# Patient Record
Sex: MALE | Race: WHITE | URBAN - METROPOLITAN AREA
[De-identification: names, ages, dates, MRNs, and addresses within clinical notes are randomized per-mention and may not be internally consistent; named-entity substitution may affect disease eponyms.]

---

## 2017-05-10 ENCOUNTER — MYC MEDICAL ADVICE (OUTPATIENT)
Dept: FAMILY MEDICINE | Facility: CLINIC | Age: 37
End: 2017-05-10

## 2017-05-10 NOTE — TELEPHONE ENCOUNTER
Requesting Finasteride refill  Last seen 6/24/2015  [employer] message sent to pt; needs appt  Antonina HERNANDEZ RN

## 2017-05-17 ENCOUNTER — OFFICE VISIT (OUTPATIENT)
Dept: FAMILY MEDICINE | Facility: CLINIC | Age: 37
End: 2017-05-17
Payer: COMMERCIAL

## 2017-05-17 VITALS
WEIGHT: 182 LBS | OXYGEN SATURATION: 95 % | HEART RATE: 72 BPM | TEMPERATURE: 97.8 F | DIASTOLIC BLOOD PRESSURE: 66 MMHG | BODY MASS INDEX: 24.65 KG/M2 | HEIGHT: 72 IN | SYSTOLIC BLOOD PRESSURE: 111 MMHG

## 2017-05-17 DIAGNOSIS — R73.01 IMPAIRED FASTING GLUCOSE: ICD-10-CM

## 2017-05-17 DIAGNOSIS — L65.9 ALOPECIA: Primary | ICD-10-CM

## 2017-05-17 PROCEDURE — 99213 OFFICE O/P EST LOW 20 MIN: CPT | Performed by: FAMILY MEDICINE

## 2017-05-17 RX ORDER — FINASTERIDE 5 MG/1
TABLET, FILM COATED ORAL
Qty: 30 TABLET | Refills: 3 | Status: SHIPPED | OUTPATIENT
Start: 2017-05-17 | End: 2018-05-20

## 2017-05-17 NOTE — NURSING NOTE
Chief Complaint   Patient presents with     [facility]     meds     Recheck Medication     finasteride        Initial There were no vitals taken for this visit. Estimated body mass index is 22.44 kg/(m^2) as calculated from the following:    Height as of 6/24/15: 6' (1.829 m).    Weight as of 6/24/15: 165 lb 7 oz (75 kg).  Medication Reconciliation: complete   Comfort Reinoso ma

## 2017-05-17 NOTE — PROGRESS NOTES
SUBJECTIVE:                                                    Yonis Mcnair is a 36 year old male who presents to clinic today for the following health issues:    New patient establish care. He had been seeing a physician at [facility], but that physician is retiring so he is looking to establish new primary care.    Medication Followup of Finasteride    Taking Medication as prescribed: yes    Side Effects:  None    Medication Helping Symptoms:  yes       He has been on finasteride 5 mg quarter tablet daily for a couple of years for male pattern baldness. He feels like it's been helpful. He would like to stay on it. He is not expressing any known side effects.    I note that he had lab work done a couple years ago. Lipid panel was normal, but fasting glucose was elevated slightly at 104.    Problem list and histories reviewed & adjusted, as indicated.  Additional history: as documented    Patient Active Problem List   Diagnosis     Alopecia     Hip pain, chronic, left     S/P LASIK surgery     Impaired fasting glucose     Past Surgical History:   Procedure Laterality Date     ARTHROSCOPY HIP, OSTEOPLASTY FEMUR PROXIMAL, COMBINED  1/14/2013    Procedure: COMBINED ARTHROSCOPY HIP, OSTEOPLASTY FEMUR PROXIMAL;  Left Hip Arthroscopy, Cam Correction, Labral Repair ,;  Surgeon: Stanley Edwards MD;  [facility]     ARTHROSCOPY SHOULDER SUPERIOR LABRUM ANTERIOR TO POSTERIOR REPAIR  12/30/2011    Procedure:ARTHROSCOPY SHOULDER SUPERIOR LABRUM ANTERIOR TO POSTERIOR REPAIR; Right Shoulder Arthroscopy,   Superior Labral Anteroposterior Repair, Distal Clavicle Excision              ; Surgeon:JAI HERRERA; Location:[facility]     C SHOULDER SURG PROC UNLISTED       HERNIA REPAIR, UMBILICAL  1979     LASIK Bilateral March 2011       Social History   Substance Use Topics     Smoking status: Former Smoker     Types: Cigarettes     Smokeless tobacco: Former User     Alcohol use 6.0 oz/week      Comment: 5-10DRINKS PER WEEK   "    Family History   Problem Relation Age of Onset     Lipids Father      Allergies Mother      C.A.D. Maternal Uncle      Musculoskeletal Disorder Paternal Grandfather      HEART DISEASE Paternal Grandfather      Asthma Maternal Grandfather      Thyroid Disease Paternal Grandmother      DIABETES Paternal Grandmother      Depression Paternal Grandmother      Breast Cancer Maternal Grandmother      CANCER Maternal Grandmother      Hypertension No family hx of      CEREBROVASCULAR DISEASE No family hx of      Glaucoma No family hx of      Macular Degeneration No family hx of          Current Outpatient Prescriptions   Medication Sig Dispense Refill     Multiple Vitamins-Minerals (MULTIVITAMIN ADULT PO) Take by mouth daily       Fish Oil-Cholecalciferol (FISH OIL + D3 PO)        finasteride (PROSCAR) 5 MG tablet 1/4 pill daily 30 tablet 3     Allergies   Allergen Reactions     Amoxicillin Rash       Reviewed and updated as needed this visit by clinical staff  Tobacco  Allergies  Med Hx  Surg Hx  Fam Hx  Soc Hx      Reviewed and updated as needed this visit by Provider         ROS:  C: NEGATIVE for fever, chills, change in weight  E/M: NEGATIVE for ear, mouth and throat problems  R: NEGATIVE for significant cough or SOB  CV: NEGATIVE for chest pain, palpitations or peripheral edema    OBJECTIVE:                                                    /66 (BP Location: Right arm, Patient Position: Chair, Cuff Size: Adult Regular)  Pulse 72  Temp 97.8  F (36.6  C) (Oral)  Ht 5' 11.75\" (1.822 m)  Wt 182 lb (82.6 kg)  SpO2 95%  BMI 24.86 kg/m2  Body mass index is 24.86 kg/(m^2).  GENERAL: healthy, alert and no distress  HENT: He has a good head of hair without any patchy hair loss or cosmetically unappealing male pattern hair loss    Diagnostic Test Results:  none      ASSESSMENT/PLAN:                                                        ICD-10-CM    1. Alopecia L65.9 finasteride (PROSCAR) 5 MG tablet   2. " Impaired fasting glucose R73.01      We will continue the same finasteride  I recommended a recheck in 1 year with another physical and some repeat fasting lab work    Davey King MD  [facility]  \

## 2017-05-17 NOTE — MR AVS SNAPSHOT
"              After Visit Summary   5/17/2017    Yonis Mcnair    MRN: [id number]           Patient Information     Date Of Birth          1980        Visit Information        Provider Department      5/17/2017 3:20 PM Davey King MD [facility]        Today's Diagnoses     Alopecia    -  1    Impaired fasting glucose           Follow-ups after your visit        Follow-up notes from your care team     Return in about 1 year (around 5/17/2018).      Who to contact     If you have questions or need follow up information about today's clinic visit or your schedule please contact [facility] directly at [phone].  Normal or non-critical lab and imaging results will be communicated to you by MyChart, letter or phone within 4 business days after the clinic has received the results. If you do not hear from us within 7 days, please contact the clinic through [employer] or phone. If you have a critical or abnormal lab result, we will notify you by phone as soon as possible.  Submit refill requests through [employer] or call your pharmacy and they will forward the refill request to us. Please allow 3 business days for your refill to be completed.          Additional Information About Your Visit        MyChart Information     [employer] gives you secure access to your electronic health record. If you see a primary care provider, you can also send messages to your care team and make appointments. If you have questions, please call your primary care clinic.  If you do not have a primary care provider, please call [phone] and they will assist you.        Care EveryWhere ID     This is your Care EveryWhere ID. This could be used by other organizations to access your [place] medical records  [id number]        Your Vitals Were     Pulse Temperature Height Pulse Oximetry BMI (Body Mass Index)       72 97.8  F (36.6  C) (Oral) 5' 11.75\" (1.822 m) 95% 24.86 kg/m2        Blood " Pressure from Last 3 Encounters:   05/17/17 111/66   06/24/15 108/78   03/11/15 120/78    Weight from Last 3 Encounters:   05/17/17 182 lb (82.6 kg)   06/24/15 165 lb 7 oz (75 kg)   03/11/15 168 lb (76.2 kg)              Today, you had the following     No orders found for display         Where to get your medicines      These medications were sent to [facility] [address] [address] AT [address] [employer]  [address] [place] [address]     Phone:  [phone]     finasteride 5 MG tablet          Primary Care Provider    None       No address on file        Thank you!     Thank you for choosing [facility]  for your care. Our goal is always to provide you with excellent care. Hearing back from our patients is one way we can continue to improve our services. Please take a few minutes to complete the written survey that you may receive in the mail after your visit with us. Thank you!             Your Updated Medication List - Protect others around you: Learn how to safely use, store and throw away your medicines at www.disposemymeds.org.          This list is accurate as of: 5/17/17  3:47 PM.  Always use your most recent med list.                   Brand Name Dispense Instructions for use    finasteride 5 MG tablet    PROSCAR    30 tablet    1/4 pill daily       FISH OIL + D3 PO          MULTIVITAMIN ADULT PO      Take by mouth daily

## 2017-07-05 ENCOUNTER — THERAPY VISIT (OUTPATIENT)
Dept: PHYSICAL THERAPY | Facility: CLINIC | Age: 37
End: 2017-07-05
Payer: COMMERCIAL

## 2017-07-05 DIAGNOSIS — M25.571 ACUTE RIGHT ANKLE PAIN: Primary | ICD-10-CM

## 2017-07-05 DIAGNOSIS — S86.011A STRAIN OF ACHILLES TENDON, RIGHT, INITIAL ENCOUNTER: ICD-10-CM

## 2017-07-05 PROCEDURE — 97161 PT EVAL LOW COMPLEX 20 MIN: CPT | Mod: GP | Performed by: PHYSICAL THERAPIST

## 2017-07-05 PROCEDURE — 97110 THERAPEUTIC EXERCISES: CPT | Mod: GP | Performed by: PHYSICAL THERAPIST

## 2017-07-05 PROCEDURE — 97140 MANUAL THERAPY 1/> REGIONS: CPT | Mod: GP | Performed by: PHYSICAL THERAPIST

## 2017-07-05 NOTE — MR AVS SNAPSHOT
After Visit Summary   7/5/2017    Yonis Mcnair    MRN: [id number]           Patient Information     Date Of Birth          1980        Visit Information        Provider Department      7/5/2017 2:20 PM Analia Mckeon PT [facility]        Today's Diagnoses     Acute right ankle pain    -  1    Strain of Achilles tendon, right, initial encounter           Follow-ups after your visit        Your next 10 appointments already scheduled     Jul 19, 2017 12:30 PM CDT   DARLENE Extremity with Analia Mckeon PT   [facility] ([facility])    [address]  [facility] [id number] [phone]              Who to contact     If you have questions or need follow up information about today's clinic visit or your schedule please contact [facility] THERAPY directly at [phone].  Normal or non-critical lab and imaging results will be communicated to you by [employer], letter or phone within 4 business days after the clinic has received the results. If you do not hear from us within 7 days, please contact the clinic through [employer] or phone. If you have a critical or abnormal lab result, we will notify you by phone as soon as possible.  Submit refill requests through [employer] or call your pharmacy and they will forward the refill request to us. Please allow 3 business days for your refill to be completed.          Additional Information About Your Visit        [employer] Information     [employer] gives you secure access to your electronic health record. If you see a primary care provider, you can also send messages to your care team and make appointments. If you have questions, please call your primary care clinic.  If you do not have a primary care provider, please call [phone] and they will assist you.        Care EveryWhere ID     This is your  Care EveryWhere ID. This could be used by other organizations to access your [place] medical records  [id number]         Blood Pressure from Last 3 Encounters:   05/17/17 111/66   06/24/15 108/78   03/11/15 120/78    Weight from Last 3 Encounters:   05/17/17 82.6 kg (182 lb)   06/24/15 75 kg (165 lb 7 oz)   03/11/15 76.2 kg (168 lb)              We Performed the Following     HC PT EVAL, LOW COMPLEXITY     DARLENE INITIAL EVAL REPORT     MANUAL THER TECH,1+REGIONS,EA 15 MIN     THERAPEUTIC EXERCISES        Primary Care Provider    None       No address on file        Equal Access to Services     CARLOS EDUARDO [facility]PAUL : Hadii aad ku hadasho Soomaali, waaxda luqadaha, qaybta kaalmada adeprudenceyada, meghan chaney adeprudence rodriguez . So [facility] [phone].    ATENCIÓN: Si habla español, tiene a pearson disposición servicios gratuitos de asistencia lingüística. Llame al [phone].    We comply with applicable federal civil rights laws and [place] laws. We do not discriminate on the basis of race, color, national origin, age, disability sex, sexual orientation or gender identity.            Thank you!     Thank you for choosing [facility] THER[facility]  for your care. Our goal is always to provide you with excellent care. Hearing back from our patients is one way we can continue to improve our services. Please take a few minutes to complete the written survey that you may receive in the mail after your visit with us. Thank you!             Your Updated Medication List - Protect others around you: Learn how to safely use, store and throw away your medicines at www.disposemymeds.org.          This list is accurate as of: 7/5/17 11:59 PM.  Always use your most recent med list.                   Brand Name Dispense Instructions for use Diagnosis    finasteride 5 MG tablet    PROSCAR    30 tablet    1/4 pill daily    Alopecia       FISH OIL + D3 PO           MULTIVITAMIN ADULT PO      Take by  mouth daily

## 2017-07-11 PROBLEM — M25.571 ACUTE RIGHT ANKLE PAIN: Status: ACTIVE | Noted: 2017-07-11

## 2017-07-11 PROBLEM — S86.011A STRAIN OF ACHILLES TENDON, RIGHT, INITIAL ENCOUNTER: Status: ACTIVE | Noted: 2017-07-11

## 2017-07-11 NOTE — PROGRESS NOTES
"Subjective:    Patient is a 36 year old male presenting with rehab right ankle/foot hpi. The history is provided by the patient. No  was used.   Yonis Mcnair is a 36 year old male with a right ankle condition.  Condition occurred with:  Other reason.  Condition occurred: during recreation/sport.  This is a new condition  Pt was quickly changing directions in tennis on 3-26-17 and about 10-15 min before he had the injury, he felt some pain along achilles insertion. He felt and heard a sounds as if he were to \"snap his fingers slightly\" and he immediately wasn't able to use his foot very well to push off. He went into the [facility] urgent care, the Martínez's test was negative and he was able to WBAT in a cam boot for 4 weeks. He slowly weaned off crutches for 2-3 weeks then went to a shoe with a heel lift for 1 week. In the past 2 months, he has become less and less sore and has been doing 3 weeks of the eccentric protocol (2 sets of 15, 2x/day). He continues to have stiffness and a \"thick feeling\" and feels better after doing some make-shift graston technique but struggles to do it on his own though he does feel it helps. He doesn't feel like he could run right now but doesn't feel comfortable doing anything side to side swiftly. PMH: he believes he has sprained his ankles but that was years ago and he hasn't had any recurring instability. .    Patient reports pain:  Posterior (achilles).  Radiates to:  No radiation.  Pain is described as aching and is intermittent and reported as 2/10 (first thing in the am).  Associated symptoms:  Loss of strength and loss of motion/stiffness. Pain is worse in the A.M..  Symptoms are exacerbated by walking, running, ascending stairs and descending stairs Relieved by: tried US without much difference.  Since onset symptoms are gradually improving.  Special tests:  X-ray.      General health as reported by patient is excellent.  Pertinent medical history includes:  " None.  Medical allergies: yes (amoxicillin).  Other surgeries include:  Orthopedic surgery and other (R shoulder SLAP 2011, Jan of 2013 for hip labrum, Lasik 2011 and herniated umbilicus infant).  Current medications:  None as reported by patient.  Current occupation is PT  .  Patient is working in normal job without restrictions.  Primary job tasks include:  Prolonged standing, prolonged sitting, lifting and repetitive tasks (computer work and pushing and pulling).    Barriers include:  None as reported by patient.    Red flags:  None as reported by patient.                        Objective:    R ankle DF = 6 vs 8 on L    glute med strength was 4-/5 on R and 4+/5 on L and after hip mobilizations, hip strength was 4/5    R hip drop with single leg stance and squat    Mild loss of DF at mid-stance to late stance and push-off on R    Assessment/Plan:      Patient is a 36 year old male with right side ankle complaints.    Patient has the following significant findings with corresponding treatment plan.                Diagnosis 1:  R achilles strain    Pain -  hot/cold therapy, manual therapy, self management, education, directional preference exercise and home program  Decreased ROM/flexibility - manual therapy and therapeutic exercise  Decreased strength - therapeutic exercise and therapeutic activities  Impaired muscle performance - neuro re-education  Decreased function - therapeutic activities    Therapy Evaluation Codes:   1) History comprised of:   Personal factors that impact the plan of care:      None.    Comorbidity factors that impact the plan of care are:      None.     Medications impacting care: None.  2) Examination of Body Systems comprised of:   Body structures and functions that impact the plan of care:      Ankle.   Activity limitations that impact the plan of care are:      Jumping, Running, Sports, Squatting/kneeling and Stairs.  3) Clinical presentation characteristics  are:   Stable/Uncomplicated.  4) Decision-Making    Low complexity using standardized patient assessment instrument and/or measureable assessment of functional outcome.  Cumulative Therapy Evaluation is: Low complexity.    Previous and current functional limitations:  (See Goal Flow Sheet for this information)    Short term and Long term goals: (See Goal Flow Sheet for this information)     Communication ability:  Patient appears to be able to clearly communicate and understand verbal and written communication and follow directions correctly.  Treatment Explanation - The following has been discussed with the patient:   RX ordered/plan of care  Anticipated outcomes  Possible risks and side effects  This patient would benefit from PT intervention to resume normal activities.   Rehab potential is good.    Frequency:  1 X week, once daily  Duration:  for every 2 weeks for 4 visits total (8 weeks)  Discharge Plan:  Achieve all LTG.  Independent in home treatment program.  Reach maximal therapeutic benefit.    Please refer to the daily flowsheet for treatment today, total treatment time and time spent performing 1:1 timed codes.

## 2017-07-19 ENCOUNTER — THERAPY VISIT (OUTPATIENT)
Dept: PHYSICAL THERAPY | Facility: CLINIC | Age: 37
End: 2017-07-19
Payer: COMMERCIAL

## 2017-07-19 DIAGNOSIS — M25.571 ACUTE RIGHT ANKLE PAIN: ICD-10-CM

## 2017-07-19 DIAGNOSIS — S86.011A STRAIN OF ACHILLES TENDON, RIGHT, INITIAL ENCOUNTER: ICD-10-CM

## 2017-07-19 PROCEDURE — 97140 MANUAL THERAPY 1/> REGIONS: CPT | Mod: GP | Performed by: PHYSICAL THERAPIST

## 2017-07-19 NOTE — PROGRESS NOTES
"Subjective:    HPI                    Objective:    System    Physical Exam    General     ROS    Assessment/Plan:      SUBJECTIVE  Subjective changes as noted by pt:  He felt the night splint might have helped the first couple of nights but now he feels pretty much the same. When he does the repeated movement in the am he definitely feels the strain but he feels less stiffness as the day goes on and when he keeps his knee straight he feels some strain. One week ago he felt very sore and stiff for a day after running 1.5 blocks but he didn't feel like he didn't have the power he just felt stiff    Current pain level: 0/10     Changes in function:  Yes (See Goal flowsheet attached for changes in current functional level)     Adverse reaction to treatment or activity:  None     OBJECTIVE  Changes in objective findings:  Yes, less thickness and hypertonicity noted in distal achilles attachment today--most of the soft tissue restriction noted along medial portion 2\"-4\" above distal attachment        ASSESSMENT  Yonis continues to require intervention to meet STG and LTG's: PT  Patient's symptoms are resolving.  Patient is progressing as expected.  Response to therapy has shown an improvement in  pain level and flexibility  Progress made towards STG/LTG?  Yes (See Goal flowsheet attached for updates on achievement of STG and LTG)    PLAN  Current treatment program is being advanced to more complex exercises.    PTA/ATC plan:  N/A    Please refer to the daily flowsheet for treatment today, total treatment time and time spent performing 1:1 timed codes.              "

## 2017-07-19 NOTE — MR AVS SNAPSHOT
After Visit Summary   7/19/2017    Yonis Mcnair    MRN: [id number]           Patient Information     Date Of Birth          1980        Visit Information        Provider Department      7/19/2017 12:30 PM Analia Mckeon PT [facility]        Today's Diagnoses     Strain of Achilles tendon, right, initial encounter        Acute right ankle pain           Follow-ups after your visit        Your next 10 appointments already scheduled     Aug 02, 2017  9:50 AM CDT   DARLENE Extremity with Analia Mckeon PT   [facility] ([facility])    [address]  [facility] [id number] [phone]              Who to contact     If you have questions or need follow up information about today's clinic visit or your schedule please contact [facility] PHYSICAL THERAPY directly at [phone].  Normal or non-critical lab and imaging results will be communicated to you by MyChart, letter or phone within 4 business days after the clinic has received the results. If you do not hear from us within 7 days, please contact the clinic through [facility] or phone. If you have a critical or abnormal lab result, we will notify you by phone as soon as possible.  Submit refill requests through [employer] or call your pharmacy and they will forward the refill request to us. Please allow 3 business days for your refill to be completed.          Additional Information About Your Visit        MyChart Information     [employer] gives you secure access to your electronic health record. If you see a primary care provider, you can also send messages to your care team and make appointments. If you have questions, please call your primary care clinic.  If you do not have a primary care provider, please call [phone] and they will assist you.        Care EveryWhere ID     This is your  Care EveryWhere ID. This could be used by other organizations to access your [place] medical records  [id number]         Blood Pressure from Last 3 Encounters:   05/17/17 111/66   06/24/15 108/78   03/11/15 120/78    Weight from Last 3 Encounters:   05/17/17 82.6 kg (182 lb)   06/24/15 75 kg (165 lb 7 oz)   03/11/15 76.2 kg (168 lb)              We Performed the Following     MANUAL THER TECH,1+REGIONS,EA 15 MIN        Primary Care Provider    None       No address on file        Equal Access to Services     [facility]: Hadii aad ku hadasho Soomaali, waaxda luqadaha, qaybta kaalmada jourdan, meghan rodriguez . So [facility] [phone].    ATENCIÓN: Si habla español, tiene a pearson disposición servicios gratuitos de asistencia lingüística. LlOur [facility] of Mercy [facility] [phone].    We comply with applicable federal civil rights laws and [place] laws. We do not discriminate on the basis of race, color, national origin, age, disability sex, sexual orientation or gender identity.            Thank you!     Thank you for choosing [facility] THERAP[facility]  for your care. Our goal is always to provide you with excellent care. Hearing back from our patients is one way we can continue to improve our services. Please take a few minutes to complete the written survey that you may receive in the mail after your visit with us. Thank you!             Your Updated Medication List - Protect others around you: Learn how to safely use, store and throw away your medicines at www.disposemymeds.org.          This list is accurate as of: 7/19/17 11:59 PM.  Always use your most recent med list.                   Brand Name Dispense Instructions for use Diagnosis    finasteride 5 MG tablet    PROSCAR    30 tablet    1/4 pill daily    Alopecia       FISH OIL + D3 PO           MULTIVITAMIN ADULT PO      Take by mouth daily

## 2018-05-20 DIAGNOSIS — L65.9 ALOPECIA: ICD-10-CM

## 2018-05-21 NOTE — TELEPHONE ENCOUNTER
"Requested Prescriptions   Pending Prescriptions Disp Refills     finasteride (PROSCAR) 5 MG tablet [Pharmacy Med Name: FINASTERIDE 5MG TABLETS] 30 tablet 0    Last Written Prescription Date:  5-17-17  Last Fill Quantity: 30,  # refills: 3   Last office visit: 5/17/2017 with prescribing provider:     Future Office Visit:   Next 5 appointments (look out 90 days)     May 30, 2018 10:00 AM CDT   [facility] with Davey King MD   [facility]    [address] [place] [address]   [phone]                  Sig: TAKE 1/4 TABLET BY MOUTH DAILY    Alpha Blockers Failed    5/20/2018  3:20 PM       Failed - Blood pressure under 140/90 in past 12 months    BP Readings from Last 3 Encounters:   05/17/17 111/66   06/24/15 108/78   03/11/15 120/78                Passed - Recent (12 mo) or future (30 days) visit within the authorizing provider's specialty    Patient had office visit in the last 12 months or has a visit in the next 30 days with authorizing provider or within the authorizing provider's specialty.  See \"Patient Info\" tab in inbasket, or \"Choose Columns\" in Meds & Orders section of the refill encounter.           Passed - Patient does not have Tadalafil, Vardenafil, or Sildenafil on their medication list       Passed - Patient is 18 years of age or older          "

## 2018-05-23 RX ORDER — FINASTERIDE 5 MG/1
TABLET, FILM COATED ORAL
Qty: 30 TABLET | Refills: 0 | Status: SHIPPED | OUTPATIENT
Start: 2018-05-23 | End: 2018-05-30

## 2018-05-23 NOTE — TELEPHONE ENCOUNTER
Prescription approved per [facility] [place].  Pt takes for alopecia, has office visit schedule 5/30/18  Traci Gallegos RN

## 2018-05-30 ENCOUNTER — OFFICE VISIT (OUTPATIENT)
Dept: FAMILY MEDICINE | Facility: CLINIC | Age: 38
End: 2018-05-30
Payer: COMMERCIAL

## 2018-05-30 VITALS
SYSTOLIC BLOOD PRESSURE: 121 MMHG | BODY MASS INDEX: 25.47 KG/M2 | WEIGHT: 188 LBS | DIASTOLIC BLOOD PRESSURE: 77 MMHG | TEMPERATURE: 98.5 F | HEIGHT: 72 IN | HEART RATE: 68 BPM | OXYGEN SATURATION: 96 %

## 2018-05-30 DIAGNOSIS — Z11.4 SCREENING FOR HIV (HUMAN IMMUNODEFICIENCY VIRUS): ICD-10-CM

## 2018-05-30 DIAGNOSIS — R73.01 IMPAIRED FASTING GLUCOSE: ICD-10-CM

## 2018-05-30 DIAGNOSIS — L65.9 ALOPECIA: Primary | ICD-10-CM

## 2018-05-30 PROBLEM — S86.011A STRAIN OF ACHILLES TENDON, RIGHT, INITIAL ENCOUNTER: Status: RESOLVED | Noted: 2017-07-11 | Resolved: 2018-05-30

## 2018-05-30 PROBLEM — M25.571 ACUTE RIGHT ANKLE PAIN: Status: RESOLVED | Noted: 2017-07-11 | Resolved: 2018-05-30

## 2018-05-30 LAB
CHOLEST SERPL-MCNC: 194 MG/DL
GLUCOSE SERPL-MCNC: 118 MG/DL (ref 70–99)
HBA1C MFR BLD: 5.6 % (ref 0–5.6)
HDLC SERPL-MCNC: 55 MG/DL
LDLC SERPL CALC-MCNC: 123 MG/DL
NONHDLC SERPL-MCNC: 139 MG/DL
TRIGL SERPL-MCNC: 81 MG/DL

## 2018-05-30 PROCEDURE — 87389 HIV-1 AG W/HIV-1&-2 AB AG IA: CPT | Performed by: FAMILY MEDICINE

## 2018-05-30 PROCEDURE — 99213 OFFICE O/P EST LOW 20 MIN: CPT | Performed by: FAMILY MEDICINE

## 2018-05-30 PROCEDURE — 80061 LIPID PANEL: CPT | Performed by: FAMILY MEDICINE

## 2018-05-30 PROCEDURE — 82947 ASSAY GLUCOSE BLOOD QUANT: CPT | Performed by: FAMILY MEDICINE

## 2018-05-30 PROCEDURE — 83036 HEMOGLOBIN GLYCOSYLATED A1C: CPT | Performed by: FAMILY MEDICINE

## 2018-05-30 PROCEDURE — 36415 COLL VENOUS BLD VENIPUNCTURE: CPT | Performed by: FAMILY MEDICINE

## 2018-05-30 RX ORDER — FINASTERIDE 5 MG/1
TABLET, FILM COATED ORAL
Qty: 30 TABLET | Refills: 3 | Status: SHIPPED | OUTPATIENT
Start: 2018-05-30 | End: 2019-05-29

## 2018-05-30 NOTE — MR AVS SNAPSHOT
After Visit Summary   5/30/2018    Yonis Mcnair    MRN: [id number]           Patient Information     Date Of Birth          1980        Visit Information        Provider Department      5/30/2018 10:00 AM Davey King MD [facility]        Today's Diagnoses     Alopecia    -  1    Impaired fasting glucose        Screening for HIV (human immunodeficiency virus)           Follow-ups after your visit        Follow-up notes from your care team     Return in about 1 year (around 5/30/2019).      Who to contact     If you have questions or need follow up information about today's clinic visit or your schedule please contact [facility] directly at [phone].  Normal or non-critical lab and imaging results will be communicated to you by MyChart, letter or phone within 4 business days after the clinic has received the results. If you do not hear from us within 7 days, please contact the clinic through [employer] or phone. If you have a critical or abnormal lab result, we will notify you by phone as soon as possible.  Submit refill requests through [employer] or call your pharmacy and they will forward the refill request to us. Please allow 3 business days for your refill to be completed.          Additional Information About Your Visit        MyChart Information     [employer] gives you secure access to your electronic health record. If you see a primary care provider, you can also send messages to your care team and make appointments. If you have questions, please call your primary care clinic.  If you do not have a primary care provider, please call [phone] and they will assist you.        Care EveryWhere ID     This is your Care EveryWhere ID. This could be used by other organizations to access your [facility] medical records  [id number]        Your Vitals Were     Pulse Temperature Height Pulse Oximetry BMI (Body Mass Index)       68 98.5  F (36.9  C)  "(Oral) 5' 11.75\" (1.822 m) 96% 25.68 kg/m2        Blood Pressure from Last 3 Encounters:   05/30/18 121/77   05/17/17 111/66   06/24/15 108/78    Weight from Last 3 Encounters:   05/30/18 188 lb (85.3 kg)   05/17/17 182 lb (82.6 kg)   06/24/15 165 lb 7 oz (75 kg)              We Performed the Following     Glucose     Hemoglobin A1c     HIV Antigen Antibody Combo     Lipid panel reflex to direct LDL Fasting          Where to get your medicines      These medications were sent to [facility] [address]  [address] [facility] [address]     Phone:  [phone]     finasteride 5 MG tablet          Primary Care Provider Office Phone # Fax #    Davey King [phone]       [address] [facility] [address]        Equal Access to Services     CARLOS EDUARDO SANTORO : Hadii aad ku hadasho Soomaali, waaxda luqadaha, qaybta kaalmada adeegyada, waxay idiin hayaan bruceeg kharaceli labrenton . So [facility] [phone].    ATENCIÓN: Si habla español, tiene a pearson disposición servicios gratuitos de asistencia lingüística. Llame al [phone].    We comply with applicable federal civil rights laws and [place] laws. We do not discriminate on the basis of race, color, national origin, age, disability, sex, sexual orientation, or gender identity.            Thank you!     Thank you for choosing [facility]  for your care. Our goal is always to provide you with excellent care. Hearing back from our patients is one way we can continue to improve our services. Please take a few minutes to complete the written survey that you may receive in the mail after your visit with us. Thank you!             Your Updated Medication List - Protect others around you: Learn how to safely use, store and throw away your medicines at www.disposemymeds.org.          This list is accurate as of 5/30/18 10:17 AM.  Always use your most recent " med list.                   Brand Name Dispense Instructions for use Diagnosis    finasteride 5 MG tablet    PROSCAR    30 tablet    TAKE 1/4 TABLET BY MOUTH DAILY    Alopecia       FISH OIL + D3 PO           MULTIVITAMIN ADULT PO      Take by mouth daily

## 2018-05-30 NOTE — PROGRESS NOTES
SUBJECTIVE:   Yonis Mcnair is a 37 year old male who presents to clinic today for the following health issues:      Medication Followup of finasteride    Taking Medication as prescribed: yes    Side Effects:  None    Medication Helping Symptoms:  yes       He is taking low-dose finasteride for androgenic alopecia.  Seems to be working well for him.  He would like to continue it.  He did have fasting labs done 3 years ago which showed impaired fasting glucose.  He came in fasting today so we could recheck those values.  Lipids were normal at that time.    He is also due for a screening HIV test.  He has had 3 HIV tests checked in the past which were all negative.  He has been in a monogamous relationship now with his male partner for 8 years and has no concerns about STDs currently.  He is agreeable to having the HIV test done today, however, with the other labs.    Problem list and histories reviewed & adjusted, as indicated.  Additional history: as documented    Patient Active Problem List   Diagnosis     Alopecia     Hip pain, chronic, left     S/P LASIK surgery     Impaired fasting glucose     Past Surgical History:   Procedure Laterality Date     ARTHROSCOPY HIP, OSTEOPLASTY FEMUR PROXIMAL, COMBINED  1/14/2013    Procedure: COMBINED ARTHROSCOPY HIP, OSTEOPLASTY FEMUR PROXIMAL;  Left Hip Arthroscopy, Cam Correction, Labral Repair ,;  Surgeon: Stanley Edwards MD;  [facility]: [facility]     ARTHROSCOPY SHOULDER SUPERIOR LABRUM ANTERIOR TO POSTERIOR REPAIR  12/30/2011    Procedure:ARTHROSCOPY SHOULDER SUPERIOR LABRUM ANTERIOR TO POSTERIOR REPAIR; Right Shoulder Arthroscopy,   Superior Labral Anteroposterior Repair, Distal Clavicle Excision              ; Surgeon:JAI HERRERA; Location:[facility]     C SHOULDER SURG PROC UNLISTED       HERNIA REPAIR, UMBILICAL  1979     LASIK Bilateral March 2011       Social History   Substance Use Topics     Smoking status: Former Smoker     Types: Cigarettes     Smokeless  "tobacco: Former User     Alcohol use 6.0 oz/week      Comment: 5-10DRINKS PER WEEK      Family History   Problem Relation Age of Onset     Lipids Father      Allergies Mother      C.A.D. Maternal Uncle      Musculoskeletal Disorder Paternal Grandfather      HEART DISEASE Paternal Grandfather      Asthma Maternal Grandfather      Thyroid Disease Paternal Grandmother      DIABETES Paternal Grandmother      Depression Paternal Grandmother      Breast Cancer Maternal Grandmother      CANCER Maternal Grandmother      Hypertension No family hx of      CEREBROVASCULAR DISEASE No family hx of      Glaucoma No family hx of      Macular Degeneration No family hx of          Current Outpatient Prescriptions   Medication Sig Dispense Refill     finasteride (PROSCAR) 5 MG tablet TAKE 1/4 TABLET BY MOUTH DAILY 30 tablet 3     Fish Oil-Cholecalciferol (FISH OIL + D3 PO)        Multiple Vitamins-Minerals (MULTIVITAMIN ADULT PO) Take by mouth daily       Allergies   Allergen Reactions     Amoxicillin Rash       Reviewed and updated as needed this visit by clinical staff  Tobacco  Allergies  Meds  Med Hx  Surg Hx  Fam Hx  Soc Hx      Reviewed and updated as needed this visit by Provider         ROS:  Noncontributory except as above    OBJECTIVE:     /77 (BP Location: Right arm, Patient Position: Chair, Cuff Size: Adult Regular)  Pulse 68  Temp 98.5  F (36.9  C) (Oral)  Ht 5' 11.75\" (1.822 m)  Wt 188 lb (85.3 kg)  SpO2 96%  BMI 25.68 kg/m2  Body mass index is 25.68 kg/(m^2).  GENERAL: healthy, alert and no distress  HENT: No apparent scalp abnormalities.  He has a fairly good head of hair without any patchy areas of hair loss.    Diagnostic Test Results:  Lab values from March 2015 were reviewed    ASSESSMENT/PLAN:       ICD-10-CM    1. Alopecia L65.9 finasteride (PROSCAR) 5 MG tablet   2. Impaired fasting glucose R73.01 Glucose     Hemoglobin A1c     Lipid panel reflex to direct LDL Fasting   3. Screening for HIV " (human immunodeficiency virus) Z11.4 HIV Antigen Antibody Combo     We will continue the same finasteride  We will check fasting labs today as above  Plan a recheck in 1 year, or sooner prn    Davey King MD  [facility]

## 2018-05-31 ENCOUNTER — MYC MEDICAL ADVICE (OUTPATIENT)
Dept: FAMILY MEDICINE | Facility: CLINIC | Age: 38
End: 2018-05-31

## 2018-05-31 LAB — HIV 1+2 AB+HIV1 P24 AG SERPL QL IA: NONREACTIVE

## 2018-05-31 NOTE — PROGRESS NOTES
"Yonis,  Your HIV screening testing is negative/normal.  Your fasting glucose is still elevated, higher than previously, but not up into a diabetes range.  Lipid values are about the same as a few years ago -- generally good, although the LDL \"bad\" cholesterol could be a little bit lower.  Continued efforts at healthy eating and regular exercise would be appropriate treatment for your lipids and glucose.    Davey King MD  "

## 2018-10-06 DIAGNOSIS — L65.9 ALOPECIA: ICD-10-CM

## 2018-10-08 RX ORDER — FINASTERIDE 5 MG/1
TABLET, FILM COATED ORAL
Qty: 30 TABLET | Refills: 5 | Status: SHIPPED | OUTPATIENT
Start: 2018-10-08 | End: 2019-05-29

## 2018-10-08 NOTE — TELEPHONE ENCOUNTER
Prescription approved per [facility] Refill Protocol.  Vicky Valles RN

## 2018-10-08 NOTE — TELEPHONE ENCOUNTER
"Requested Prescriptions   Pending Prescriptions Disp Refills     finasteride (PROSCAR) 5 MG tablet [Pharmacy Med Name: FINASTERIDE 5MG TABLETS] 30 tablet 0    Last Written Prescription Date:  5/30/18  Last Fill Quantity: 30,  # refills: 3   Last office visit: 5/30/2018 with prescribing provider:     Future Office Visit:     Sig: TAKE 1/4 TABLET BY MOUTH DAILY    Alpha Blockers Passed    10/6/2018  3:11 AM       Passed - Blood pressure under 140/90 in past 12 months    BP Readings from Last 3 Encounters:   05/30/18 121/77   05/17/17 111/66   06/24/15 108/78                Passed - Recent (12 mo) or future (30 days) visit within the authorizing provider's specialty    Patient had office visit in the last 12 months or has a visit in the next 30 days with authorizing provider or within the authorizing provider's specialty.  See \"Patient Info\" tab in inbasket, or \"Choose Columns\" in Meds & Orders section of the refill encounter.           Passed - Patient does not have Tadalafil, Vardenafil, or Sildenafil on their medication list       Passed - Patient is 18 years of age or older          "

## 2018-12-26 ENCOUNTER — OFFICE VISIT (OUTPATIENT)
Dept: OPTOMETRY | Facility: CLINIC | Age: 38
End: 2018-12-26
Payer: COMMERCIAL

## 2018-12-26 DIAGNOSIS — Z98.890 S/P LASIK SURGERY: ICD-10-CM

## 2018-12-26 DIAGNOSIS — H52.222 REGULAR ASTIGMATISM OF LEFT EYE: ICD-10-CM

## 2018-12-26 DIAGNOSIS — Z01.01 ENCOUNTER FOR EXAMINATION OF EYES AND VISION WITH ABNORMAL FINDINGS: Primary | ICD-10-CM

## 2018-12-26 DIAGNOSIS — H52.13 MYOPIA OF BOTH EYES: ICD-10-CM

## 2018-12-26 PROCEDURE — 92014 COMPRE OPH EXAM EST PT 1/>: CPT | Performed by: OPTOMETRIST

## 2018-12-26 PROCEDURE — 92015 DETERMINE REFRACTIVE STATE: CPT | Performed by: OPTOMETRIST

## 2018-12-26 ASSESSMENT — EXTERNAL EXAM - RIGHT EYE: OD_EXAM: NORMAL

## 2018-12-26 ASSESSMENT — REFRACTION_MANIFEST
OS_AXIS: 052
OS_SPHERE: -0.25
OD_SPHERE: -0.25
OS_CYLINDER: +0.50
OD_CYLINDER: SPHERE

## 2018-12-26 ASSESSMENT — CUP TO DISC RATIO
OD_RATIO: 0.25
OS_RATIO: 0.25

## 2018-12-26 ASSESSMENT — VISUAL ACUITY
OD_SC: 20/20
OS_SC: 20/20
OD_SC: 20/20
METHOD: SNELLEN - LINEAR
OS_SC: 20/30 -1

## 2018-12-26 ASSESSMENT — SLIT LAMP EXAM - LIDS
COMMENTS: NORMAL
COMMENTS: NORMAL

## 2018-12-26 ASSESSMENT — CONF VISUAL FIELD
OS_NORMAL: 1
OD_NORMAL: 1

## 2018-12-26 ASSESSMENT — TONOMETRY
OD_IOP_MMHG: 13
OS_IOP_MMHG: 15
IOP_METHOD: APPLANATION

## 2018-12-26 ASSESSMENT — EXTERNAL EXAM - LEFT EYE: OS_EXAM: NORMAL

## 2018-12-26 NOTE — PROGRESS NOTES
Chief Complaint   Patient presents with     Annual Eye Exam      Accompanied by self  Last Eye Exam: 2 years ago  Dilated Previously: Yes    What are you currently using to see?  does not use glasses or contacts       Distance Vision Acuity: Satisfied with vision-night vision has become difficult and more troublesome when the roads are wet.    Near Vision Acuity: Satisfied with vision while reading  unaided    Eye Comfort: dry-not so bad that he needs to use drops  Do you use eye drops? : No  Occupation or Hobbies: physical therapy    Mariaelena Carrero, Optometric Tech          Medical, surgical and family histories reviewed and updated 12/26/2018.       OBJECTIVE: See Ophthalmology exam    ASSESSMENT:    ICD-10-CM    1. Encounter for examination of eyes and vision with abnormal findings Z01.01    2. S/P LASIK surgery Z98.890    3. Myopia of both eyes H52.13    4. Regular astigmatism of left eye H52.222       PLAN:     Patient Instructions   No glasses Rx necessary at this time.  History of LASIK each eye in 2011.     Return for comprehensive eye exam in 2 years, or sooner if needed.       Juan No O.D.  [address] [place]  [address]    [phone]

## 2018-12-26 NOTE — LETTER
12/26/2018         RE: Yonis Mcnair  [address]  [facility] [address]        Dear Colleague,    Thank you for referring your patient, Yonis Mcnair, to the [facility]. Please see a copy of my visit note below.    Chief Complaint   Patient presents with     Annual Eye Exam      Accompanied by self  Last Eye Exam: 2 years ago  Dilated Previously: Yes    What are you currently using to see?  does not use glasses or contacts       Distance Vision Acuity: Satisfied with vision-night vision has become difficult and more troublesome when the roads are wet.    Near Vision Acuity: Satisfied with vision while reading  unaided    Eye Comfort: dry-not so bad that he needs to use drops  Do you use eye drops? : No  Occupation or Hobbies: physical therapy    Mariaelena Carrero, Optometric Tech          Medical, surgical and family histories reviewed and updated 12/26/2018.       OBJECTIVE: See Ophthalmology exam    ASSESSMENT:    ICD-10-CM    1. Encounter for examination of eyes and vision with abnormal findings Z01.01    2. S/P LASIK surgery Z98.890    3. Myopia of both eyes H52.13    4. Regular astigmatism of left eye H52.222       PLAN:     Patient Instructions   No glasses Rx necessary at this time.  History of LASIK each eye in 2011.     Return for comprehensive eye exam in 2 years, or sooner if needed.       Juan No O.D.  [facility]  [address] [place] [address]. [place]  [address]    [phone]           Again, thank you for allowing me to participate in the care of your patient.        Sincerely,        Juan No, RUFINO

## 2018-12-26 NOTE — PATIENT INSTRUCTIONS
No glasses Rx necessary at this time.  History of LASIK each eye in 2011.     Return for comprehensive eye exam in 2 years, or sooner if needed.       Juan No O.D.  [facility] [place] [place]  [place]  [address]    [phone]

## 2019-05-29 ENCOUNTER — OFFICE VISIT (OUTPATIENT)
Dept: FAMILY MEDICINE | Facility: CLINIC | Age: 39
End: 2019-05-29
Payer: COMMERCIAL

## 2019-05-29 VITALS
HEIGHT: 72 IN | DIASTOLIC BLOOD PRESSURE: 73 MMHG | BODY MASS INDEX: 24.95 KG/M2 | HEART RATE: 71 BPM | TEMPERATURE: 98.9 F | WEIGHT: 184.25 LBS | SYSTOLIC BLOOD PRESSURE: 117 MMHG

## 2019-05-29 DIAGNOSIS — R73.03 PREDIABETES: ICD-10-CM

## 2019-05-29 DIAGNOSIS — Z00.00 ENCOUNTER FOR PREVENTATIVE ADULT HEALTH CARE EXAMINATION: Primary | ICD-10-CM

## 2019-05-29 DIAGNOSIS — L65.9 ALOPECIA: ICD-10-CM

## 2019-05-29 DIAGNOSIS — E78.5 HYPERLIPIDEMIA LDL GOAL <100: ICD-10-CM

## 2019-05-29 LAB
CHOLEST SERPL-MCNC: 167 MG/DL
GLUCOSE BLD-MCNC: 96 MG/DL (ref 70–99)
HBA1C MFR BLD: 5.2 % (ref 0–5.6)
HDLC SERPL-MCNC: 50 MG/DL
LDLC SERPL CALC-MCNC: 94 MG/DL
NONHDLC SERPL-MCNC: 117 MG/DL
TRIGL SERPL-MCNC: 116 MG/DL

## 2019-05-29 PROCEDURE — 82947 ASSAY GLUCOSE BLOOD QUANT: CPT | Performed by: FAMILY MEDICINE

## 2019-05-29 PROCEDURE — 80061 LIPID PANEL: CPT | Performed by: FAMILY MEDICINE

## 2019-05-29 PROCEDURE — 36415 COLL VENOUS BLD VENIPUNCTURE: CPT | Performed by: FAMILY MEDICINE

## 2019-05-29 PROCEDURE — 99395 PREV VISIT EST AGE 18-39: CPT | Performed by: FAMILY MEDICINE

## 2019-05-29 PROCEDURE — 83036 HEMOGLOBIN GLYCOSYLATED A1C: CPT | Performed by: FAMILY MEDICINE

## 2019-05-29 RX ORDER — FINASTERIDE 5 MG/1
TABLET, FILM COATED ORAL
Qty: 30 TABLET | Refills: 3 | Status: SHIPPED | OUTPATIENT
Start: 2019-05-29 | End: 2019-07-02

## 2019-05-29 ASSESSMENT — MIFFLIN-ST. JEOR: SCORE: 1784.81

## 2019-05-29 ASSESSMENT — ANXIETY QUESTIONNAIRES
2. NOT BEING ABLE TO STOP OR CONTROL WORRYING: NOT AT ALL
3. WORRYING TOO MUCH ABOUT DIFFERENT THINGS: NOT AT ALL
GAD7 TOTAL SCORE: 0
5. BEING SO RESTLESS THAT IT IS HARD TO SIT STILL: NOT AT ALL
7. FEELING AFRAID AS IF SOMETHING AWFUL MIGHT HAPPEN: NOT AT ALL
6. BECOMING EASILY ANNOYED OR IRRITABLE: NOT AT ALL
IF YOU CHECKED OFF ANY PROBLEMS ON THIS QUESTIONNAIRE, HOW DIFFICULT HAVE THESE PROBLEMS MADE IT FOR YOU TO DO YOUR WORK, TAKE CARE OF THINGS AT HOME, OR GET ALONG WITH OTHER PEOPLE: NOT DIFFICULT AT ALL
1. FEELING NERVOUS, ANXIOUS, OR ON EDGE: NOT AT ALL

## 2019-05-29 ASSESSMENT — PAIN SCALES - GENERAL: PAINLEVEL: NO PAIN (0)

## 2019-05-29 ASSESSMENT — PATIENT HEALTH QUESTIONNAIRE - PHQ9
SUM OF ALL RESPONSES TO PHQ QUESTIONS 1-9: 0
5. POOR APPETITE OR OVEREATING: NOT AT ALL

## 2019-05-29 NOTE — PATIENT INSTRUCTIONS
Preventive Health Recommendations  Male Ages 26 - 39    Yearly exam:             See your health care provider every year in order to  o   Review health changes.   o   Discuss preventive care.    o   Review your medicines if your doctor has prescribed any.    You should be tested each year for STDs (sexually transmitted diseases), if you re at risk.     After age 35, talk to your provider about cholesterol testing. If you are at risk for heart disease, have your cholesterol tested at least every 5 years.     If you are at risk for diabetes, you should have a diabetes test (fasting glucose).  Shots: Get a flu shot each year. Get a tetanus shot every 10 years.     Nutrition:    Eat at least 5 servings of fruits and vegetables daily.     Eat whole-grain bread, whole-wheat pasta and brown rice instead of white grains and rice.     Get adequate Calcium and Vitamin D.     Lifestyle    Exercise for at least 150 minutes a week (30 minutes a day, 5 days a week). This will help you control your weight and prevent disease.     Limit alcohol to one drink per day.     No smoking.     Wear sunscreen to prevent skin cancer.     See your dentist every six months for an exam and cleaning.           Keep working on healthy diet/exercise     We will send you lab results    Stay on finasteride

## 2019-05-30 ASSESSMENT — ANXIETY QUESTIONNAIRES: GAD7 TOTAL SCORE: 0

## 2019-06-22 DIAGNOSIS — L65.9 ALOPECIA: ICD-10-CM

## 2019-06-24 NOTE — TELEPHONE ENCOUNTER
"Requested Prescriptions   Pending Prescriptions Disp Refills     finasteride (PROSCAR) 5 MG tablet [Pharmacy Med Name: FINASTERIDE 5MG TABLETS] 30 tablet 0     Sig: TAKE 1/4 TABLET BY MOUTH EVERY DAY   Last Written Prescription Date:  5/29/19  Last Fill Quantity: 30,  # refills: 3   Last office visit: 5/29/2019 with prescribing provider:     Future Office Visit:        Alpha Blockers Passed - 6/22/2019 12:48 PM        Passed - Blood pressure under 140/90 in past 12 months     BP Readings from Last 3 Encounters:   05/29/19 117/73   05/30/18 121/77   05/17/17 111/66                 Passed - Recent (12 mo) or future (30 days) visit within the authorizing provider's specialty     Patient had office visit in the last 12 months or has a visit in the next 30 days with authorizing provider or within the authorizing provider's specialty.  See \"Patient Info\" tab in inbasket, or \"Choose Columns\" in Meds & Orders section of the refill encounter.              Passed - Patient does not have Tadalafil, Vardenafil, or Sildenafil on their medication list        Passed - Medication is active on med list        Passed - Patient is 18 years of age or older          "

## 2019-06-26 ENCOUNTER — OFFICE VISIT (OUTPATIENT)
Dept: FAMILY MEDICINE | Facility: CLINIC | Age: 39
End: 2019-06-26
Payer: COMMERCIAL

## 2019-06-26 VITALS
BODY MASS INDEX: 25.05 KG/M2 | WEIGHT: 178.9 LBS | HEIGHT: 71 IN | HEART RATE: 80 BPM | OXYGEN SATURATION: 100 % | SYSTOLIC BLOOD PRESSURE: 134 MMHG | DIASTOLIC BLOOD PRESSURE: 89 MMHG | TEMPERATURE: 98.2 F | RESPIRATION RATE: 18 BRPM

## 2019-06-26 DIAGNOSIS — R06.02 SHORTNESS OF BREATH: Primary | ICD-10-CM

## 2019-06-26 DIAGNOSIS — R00.2 PALPITATIONS: ICD-10-CM

## 2019-06-26 LAB
ALBUMIN SERPL-MCNC: 4.7 G/DL (ref 3.4–5)
ALP SERPL-CCNC: 84 U/L (ref 40–150)
ALT SERPL W P-5'-P-CCNC: 69 U/L (ref 0–70)
ANION GAP SERPL CALCULATED.3IONS-SCNC: 9 MMOL/L (ref 3–14)
AST SERPL W P-5'-P-CCNC: 34 U/L (ref 0–45)
BASOPHILS # BLD AUTO: 0 10E9/L (ref 0–0.2)
BASOPHILS NFR BLD AUTO: 0.4 %
BILIRUB SERPL-MCNC: 0.9 MG/DL (ref 0.2–1.3)
BUN SERPL-MCNC: 23 MG/DL (ref 7–30)
CALCIUM SERPL-MCNC: 9.7 MG/DL (ref 8.5–10.1)
CHLORIDE SERPL-SCNC: 103 MMOL/L (ref 94–109)
CO2 SERPL-SCNC: 26 MMOL/L (ref 20–32)
CREAT SERPL-MCNC: 1.01 MG/DL (ref 0.66–1.25)
DIFFERENTIAL METHOD BLD: NORMAL
EOSINOPHIL # BLD AUTO: 0.3 10E9/L (ref 0–0.7)
EOSINOPHIL NFR BLD AUTO: 3.5 %
ERYTHROCYTE [DISTWIDTH] IN BLOOD BY AUTOMATED COUNT: 12.7 % (ref 10–15)
GFR SERPL CREATININE-BSD FRML MDRD: >90 ML/MIN/{1.73_M2}
GLUCOSE SERPL-MCNC: 97 MG/DL (ref 70–99)
HCT VFR BLD AUTO: 46.3 % (ref 40–53)
HGB BLD-MCNC: 16.6 G/DL (ref 13.3–17.7)
LYMPHOCYTES # BLD AUTO: 1.8 10E9/L (ref 0.8–5.3)
LYMPHOCYTES NFR BLD AUTO: 21.8 %
MCH RBC QN AUTO: 31.6 PG (ref 26.5–33)
MCHC RBC AUTO-ENTMCNC: 35.9 G/DL (ref 31.5–36.5)
MCV RBC AUTO: 88 FL (ref 78–100)
MONOCYTES # BLD AUTO: 0.5 10E9/L (ref 0–1.3)
MONOCYTES NFR BLD AUTO: 6.6 %
NEUTROPHILS # BLD AUTO: 5.4 10E9/L (ref 1.6–8.3)
NEUTROPHILS NFR BLD AUTO: 67.7 %
PLATELET # BLD AUTO: 239 10E9/L (ref 150–450)
POTASSIUM SERPL-SCNC: 4.1 MMOL/L (ref 3.4–5.3)
PROT SERPL-MCNC: 8.4 G/DL (ref 6.8–8.8)
RBC # BLD AUTO: 5.25 10E12/L (ref 4.4–5.9)
SODIUM SERPL-SCNC: 138 MMOL/L (ref 133–144)
TROPONIN I SERPL-MCNC: <0.015 UG/L (ref 0–0.04)
WBC # BLD AUTO: 8 10E9/L (ref 4–11)

## 2019-06-26 PROCEDURE — 84484 ASSAY OF TROPONIN QUANT: CPT | Performed by: NURSE PRACTITIONER

## 2019-06-26 PROCEDURE — 85025 COMPLETE CBC W/AUTO DIFF WBC: CPT | Performed by: NURSE PRACTITIONER

## 2019-06-26 PROCEDURE — 80053 COMPREHEN METABOLIC PANEL: CPT | Performed by: NURSE PRACTITIONER

## 2019-06-26 PROCEDURE — 93000 ELECTROCARDIOGRAM COMPLETE: CPT | Performed by: NURSE PRACTITIONER

## 2019-06-26 PROCEDURE — 36415 COLL VENOUS BLD VENIPUNCTURE: CPT | Performed by: NURSE PRACTITIONER

## 2019-06-26 PROCEDURE — 99215 OFFICE O/P EST HI 40 MIN: CPT | Performed by: NURSE PRACTITIONER

## 2019-06-26 RX ORDER — FINASTERIDE 5 MG/1
TABLET, FILM COATED ORAL
Qty: 30 TABLET | Refills: 2 | Status: SHIPPED | OUTPATIENT
Start: 2019-06-26 | End: 2020-07-21

## 2019-06-26 ASSESSMENT — MIFFLIN-ST. JEOR: SCORE: 1748.62

## 2019-06-26 ASSESSMENT — PAIN SCALES - GENERAL: PAINLEVEL: NO PAIN (0)

## 2019-06-26 NOTE — PROGRESS NOTES
"Subjective     Yonis Mcnair is a 38 year old male who presents to clinic today for the following health issues:    HPI   CHEST PAIN     Onset: x3 days    Description:   Location:  entire chest  Character: tight and heavy  Radiation: None  Duration: 3 days and constant     Intensity: mild    Progression of Symptoms:  worsening    Accompanying Signs & Symptoms:  Shortness of breath: YES  Sweating: YES- This morning  Nausea/vomiting: no   Lightheadedness: no   Palpitations: no  Fever/Chills: no   Cough: no   Heartburn: no     History:   Family history of heart disease no   Tobacco use: YES- occasionally    Precipitating factors:   Worse with exertion: no   Worse with deep breaths :  no   Related to food: no, Patient did have half maybe a little less of a weed gummy and has had this issue before afterwards but nothing that has lingered for 3 days    Alleviating factors:  None       Therapies Tried and outcome: None    Pleasant 38 year old male presents with the above concerns. He had marijuana gummy on Sunday and now with the above. Same thing happened last time he had one but didn't last as long. Describes symptoms as \"mild.\" PERC negative.    Patient Active Problem List   Diagnosis     Alopecia     Hip pain, chronic, left     S/P LASIK surgery     Impaired fasting glucose     Past Surgical History:   Procedure Laterality Date     ARTHROSCOPY HIP, OSTEOPLASTY FEMUR PROXIMAL, COMBINED  1/14/2013    Procedure: COMBINED ARTHROSCOPY HIP, OSTEOPLASTY FEMUR PROXIMAL;  Left Hip Arthroscopy, Cam Correction, Labral Repair ,;  Surgeon: Stanley Edwards MD;  [facility]     ARTHROSCOPY SHOULDER SUPERIOR LABRUM ANTERIOR TO POSTERIOR REPAIR  12/30/2011    Procedure:ARTHROSCOPY SHOULDER SUPERIOR LABRUM ANTERIOR TO POSTERIOR REPAIR; Right Shoulder Arthroscopy,   Superior Labral Anteroposterior Repair, Distal Clavicle Excision              ; Surgeon:JAI HERRERA; Location:[facility]     C SHOULDER SURG PROC UNLISTED       " HERNIA REPAIR, UMBILICAL  1979     LASIK Bilateral March 2011       Social History     Tobacco Use     Smoking status: Former Smoker     Types: Cigarettes     Smokeless tobacco: Former User   Substance Use Topics     Alcohol use: Yes     Alcohol/week: 6.0 oz     Comment: 5-10DRINKS PER WEEK      Family History   Problem Relation Age of Onset     Lipids Father      Allergies Mother      C.A.D. Maternal Uncle      Musculoskeletal Disorder Paternal Grandfather      Heart Disease Paternal Grandfather      Asthma Maternal Grandfather      Thyroid Disease Paternal Grandmother      Diabetes Paternal Grandmother      Depression Paternal Grandmother      Breast Cancer Maternal Grandmother      Cancer Maternal Grandmother      Hypertension No family hx of      Cerebrovascular Disease No family hx of      Glaucoma No family hx of      Macular Degeneration No family hx of          Current Outpatient Medications   Medication Sig Dispense Refill     finasteride (PROSCAR) 5 MG tablet TAKE 1/4 TABLET BY MOUTH DAILY 30 tablet 3     Fish Oil-Cholecalciferol (FISH OIL + D3 PO)        Multiple Vitamins-Minerals (MULTIVITAMIN ADULT PO) Take by mouth daily       finasteride (PROSCAR) 5 MG tablet TAKE 1/4 TABLET BY MOUTH EVERY DAY (Patient not taking: Reported on 6/26/2019) 30 tablet 2     Allergies   Allergen Reactions     Amoxicillin Rash     BP Readings from Last 3 Encounters:   06/26/19 134/89   05/29/19 117/73   05/30/18 121/77    Wt Readings from Last 3 Encounters:   06/26/19 81.1 kg (178 lb 14.4 oz)   05/29/19 83.6 kg (184 lb 4 oz)   05/30/18 85.3 kg (188 lb)                  Reviewed and updated as needed this visit by Provider  Tobacco  Allergies  Meds  Problems  Med Hx  Surg Hx  Fam Hx         Review of Systems   ROS COMP: Constitutional, HEENT, cardiovascular, pulmonary, GI, , musculoskeletal, neuro, skin, endocrine and psych systems are negative, except as otherwise noted.      Objective    /89 (BP Location:  "Right arm, Patient Position: Sitting, Cuff Size: Adult Regular)   Pulse 80   Temp 98.2  F (36.8  C) (Oral)   Resp 18   Ht 1.803 m (5' 11\")   Wt 81.1 kg (178 lb 14.4 oz)   SpO2 100%   BMI 24.95 kg/m    Body mass index is 24.95 kg/m .  Physical Exam   GENERAL: healthy, alert and no distress  EYES: Eyes grossly normal to inspection, PERRL and conjunctivae and sclerae normal  HENT: ear canals and TM's normal, nose and mouth without ulcers or lesions  NECK: no adenopathy, no asymmetry, masses, or scars and thyroid normal to palpation  RESP: lungs clear to auscultation - no rales, rhonchi or wheezes  CV: regular rate and rhythm, normal S1 S2, no S3 or S4, no murmur, click or rub, no peripheral edema and peripheral pulses strong  ABDOMEN: soft, nontender, no hepatosplenomegaly, no masses and bowel sounds normal  MS: no gross musculoskeletal defects noted, no edema  PSYCH: mentation appears normal, affect normal/bright    Diagnostic Test Results:  Results for orders placed or performed in visit on 06/26/19   Comprehensive metabolic panel (BMP + Alb, Alk Phos, ALT, AST, Total. Bili, TP)   Result Value Ref Range    Sodium 138 133 - 144 mmol/L    Potassium 4.1 3.4 - 5.3 mmol/L    Chloride 103 94 - 109 mmol/L    Carbon Dioxide 26 20 - 32 mmol/L    Anion Gap 9 3 - 14 mmol/L    Glucose 97 70 - 99 mg/dL    Urea Nitrogen 23 7 - 30 mg/dL    Creatinine 1.01 0.66 - 1.25 mg/dL    GFR Estimate >90 >60 mL/min/[1.73_m2]    GFR Estimate If Black >90 >60 mL/min/[1.73_m2]    Calcium 9.7 8.5 - 10.1 mg/dL    Bilirubin Total 0.9 0.2 - 1.3 mg/dL    Albumin 4.7 3.4 - 5.0 g/dL    Protein Total 8.4 6.8 - 8.8 g/dL    Alkaline Phosphatase 84 40 - 150 U/L    ALT 69 0 - 70 U/L    AST 34 0 - 45 U/L   CBC with platelets differential   Result Value Ref Range    WBC 8.0 4.0 - 11.0 10e9/L    RBC Count 5.25 4.4 - 5.9 10e12/L    Hemoglobin 16.6 13.3 - 17.7 g/dL    Hematocrit 46.3 40.0 - 53.0 %    MCV 88 78 - 100 fl    MCH 31.6 26.5 - 33.0 pg    MCHC " "35.9 31.5 - 36.5 g/dL    RDW 12.7 10.0 - 15.0 %    Platelet Count 239 150 - 450 10e9/L    % Neutrophils 67.7 %    % Lymphocytes 21.8 %    % Monocytes 6.6 %    % Eosinophils 3.5 %    % Basophils 0.4 %    Absolute Neutrophil 5.4 1.6 - 8.3 10e9/L    Absolute Lymphocytes 1.8 0.8 - 5.3 10e9/L    Absolute Monocytes 0.5 0.0 - 1.3 10e9/L    Absolute Eosinophils 0.3 0.0 - 0.7 10e9/L    Absolute Basophils 0.0 0.0 - 0.2 10e9/L    Diff Method Automated Method    Troponin I   Result Value Ref Range    Troponin I ES <0.015 0.000 - 0.045 ug/L   EKG 12-lead complete w/read - Clinics    Narrative    Sinus rhythm  Anterolateral ST elevation - repolarization variant  Rate 73      QTcH 376  Normal             Assessment & Plan       ICD-10-CM    1. Shortness of breath R06.02 EKG 12-lead complete w/read - Clinics     Comprehensive metabolic panel (BMP + Alb, Alk Phos, ALT, AST, Total. Bili, TP)     CBC with platelets differential     Troponin I   2. Palpitations R00.2 EKG 12-lead complete w/read - Clinics     Comprehensive metabolic panel (BMP + Alb, Alk Phos, ALT, AST, Total. Bili, TP)     CBC with platelets differential     Troponin I     I explained that I cannot rule out serious etiologies for his symptoms here such as MI, PE, etc, and recommended emergency department evaluation. He states his symptoms are mild and declined. We did the above workup here. We discussed close follow up with primary care provider if not improving and eval in emergency department if worsening at all. I recommended no more marijuana gummies.     BMI:   Estimated body mass index is 24.95 kg/m  as calculated from the following:    Height as of this encounter: 1.803 m (5' 11\").    Weight as of this encounter: 81.1 kg (178 lb 14.4 oz).           See Patient Instructions    Return in about 1 day (around 6/27/2019), or if symptoms worsen or fail to improve.     Return precautions discussed, including when to seek urgent/emergent care.    Patient " verbalizes understanding and agrees with plan of care. Patient stable for discharge.      GERARDO Carter CNP  [facility]

## 2019-06-27 NOTE — RESULT ENCOUNTER NOTE
Yonis,  I'm happy to report that your lab results were normal. Please let us know if you have any questions. If the symptoms return, go to the emergency department. I hope you are 100% soon! Thank you for allowing us to participate in your care.  GERARDO Carter CNP

## 2019-06-30 ENCOUNTER — MYC MEDICAL ADVICE (OUTPATIENT)
Dept: FAMILY MEDICINE | Facility: CLINIC | Age: 39
End: 2019-06-30

## 2019-06-30 DIAGNOSIS — L65.9 ALOPECIA: Primary | ICD-10-CM

## 2019-07-01 NOTE — TELEPHONE ENCOUNTER
We had sent in prescription for 30 tabs.  Due to the directions, insurance only fills 7 pills at a time.    Another option is to just go with one of the 1 mg finasteride pills daily.    I pended a prescription for this.  We could print it and patient could shop around for the best place to go or we could send it in.    Please inform patient.    Leo Sutton MD

## 2019-07-01 NOTE — TELEPHONE ENCOUNTER
Routing to PCP to review and advise.    Please see My Chart message.      Rosey Londono RN  [facility]

## 2019-07-02 RX ORDER — FINASTERIDE 1 MG/1
1 TABLET, FILM COATED ORAL DAILY
Qty: 30 TABLET | Refills: 11 | Status: CANCELLED | OUTPATIENT
Start: 2019-07-02

## 2019-07-02 NOTE — TELEPHONE ENCOUNTER
I routed Dr. Sutton's response to patient.   Await patient response.    Christine Pickering RN  [facility]

## 2019-07-03 NOTE — TELEPHONE ENCOUNTER
Message sent to patient via [employer] asking below question.  Will await for response.  Vicky Valles RN

## 2019-07-03 NOTE — TELEPHONE ENCOUNTER
Sent my chart requesting patient decision.      Rosey Londono RN  [facility]

## 2019-07-03 NOTE — TELEPHONE ENCOUNTER
Confusing, but what I did is pend two prescriptions.  They are both for a month and both have 11 refills for a year.  One is for 7 of the 5 mg tabs per month and one is for 30 of the 1 mg tabs per month.  Whichever one he wants sent in, okay to do so.    Please inform patient.    Thanks    Leo Sutton MD

## 2019-07-05 NOTE — TELEPHONE ENCOUNTER
Will check after the weekend for patient's response.    Christine Pickering RN  [facility]

## 2019-07-09 NOTE — TELEPHONE ENCOUNTER
Chely[employer] message routed to patient to see if he has a preference on this refill yet.  Christine Pickering RN  [facility]

## 2019-07-10 RX ORDER — FINASTERIDE 5 MG/1
TABLET, FILM COATED ORAL
Qty: 7 TABLET | Refills: 11 | Status: SHIPPED | OUTPATIENT
Start: 2019-07-10 | End: 2020-07-21

## 2019-11-07 ENCOUNTER — HEALTH MAINTENANCE LETTER (OUTPATIENT)
Age: 39
End: 2019-11-07

## 2020-01-09 ENCOUNTER — VIRTUAL VISIT (OUTPATIENT)
Dept: FAMILY MEDICINE | Facility: OTHER | Age: 40
End: 2020-01-09

## 2020-01-10 NOTE — PROGRESS NOTES
"Date: 2020 21:05:42  Clinician: Sanjay Garland  Clinician NPI: [id number]  Patient: Yonis Mcnair  Patient : 1980  Patient Address: [address] [place] [address]  Patient Phone: [phone]  Visit Protocol: URI  Patient Summary:  Yonis is a 39 year old ( : 1980 ) male who initiated a Visit for cold, sinus infection, or influenza. When asked the question \"Please sign me up to receive news, health information and promotions from [employer].\", Yonis responded \"No\".    Yonis states his symptoms started gradually 2-3 weeks ago. After his symptoms started, they improved and then got worse again.   His symptoms consist of a headache, nasal congestion, malaise, rhinitis, and a cough. He is experiencing difficulty breathing due to nasal congestion but he is not short of breath.   Symptom details     Nasal secretions: The color of his mucus is yellow.    Cough: Yonis coughs every 5-10 minutes and his cough is not more bothersome at night. Phlegm comes into his throat when he coughs. He does not believe the phlegm causes the cough. The color of the phlegm is yellow.     Headache: He states the headache is mild (1-3 on a 10 point pain scale).      Yonis denies having sore throat, ear pain, enlarged lymph nodes, chills, fever, wheezing, teeth pain, facial pain or pressure, and myalgias. He also denies taking antibiotic medication for the symptoms, having recent facial or sinus surgery in the past 60 days, and having a sinus infection within the past year.   Precipitating events  He has not recently been exposed to someone with influenza. Yonis has been in close contact with the following high risk individuals: adults 65 or older.   Pertinent medical history  Weight: 180 lbs   Yonis does not smoke or use smokeless tobacco.   Weight: 180 lbs    MEDICATIONS: finasteride oral, ALLERGIES: amoxicillin  Clinician Response:  Dear Yonis,  Based on the information provided, you have acute bacterial sinusitis, also " known as a sinus infection. Sinus infections are caused by bacteria or a virus and symptoms are almost always identical. The difference between the 2 types of infections is timing.  Sinus infections start as viral infections and symptoms improve on their own in about 7 days. If symptoms have not improved after 7 days or have even worsened, a bacterial infection may have developed.  Medication information  I am prescribing:       Fluticasone 50 mcg/actuation nasal spray. Inhale 2 sprays in each nostril 1 time per day; after 1 week, may adjust to 1 - 2 sprays in each nostril 1 time per day. This medication takes several days to start working, so keep taking it even if it doesn't help right away. There are no refills with this prescription.      Doxycycline hyclate 100 mg oral tablet. Take 1 tablet by mouth 2 times per day for 7 days. There are no refills with this prescription.     Certain antibiotics such as Doxycycline, levofloxacin, and moxifloxacin can cause your skin to be more sensitive to the sun. Exposure to the sun when taking these antibiotics may cause skin rash, itching, redness, or a severe sunburn. Be sure to avoid prolonged or direct exposure to the sun, especially between 10 am and 3 pm, if possible. Wear protective clothing and use sunscreen of SPF 30 or higher when you are outdoors.  Unless you are allergic to the over-the-counter medication(s) below, I recommend using:       Ibuprofen (Advil or store brand) 200 mg oral tablet. Take 1-3 tablets (200-600 mg) by mouth every 8 hours to help with the discomfort. Make sure to take the ibuprofen with food. Do not exceed 2400 mg in 24 hours.    A sinus irrigation kit such as Sinus Rinse, Neti Pot, SinuCleanse, or store brand. Be sure to use sterile or previously boiled water to prevent unwanted infections.     Over-the-counter medications do not require a prescription. Ask the pharmacist if you have any questions.  Self care  The following tips will keep  you as comfortable as possible while you recover:     Rest    Drink plenty of water and other liquids    Take a hot shower to loosen congestion    Take a spoonful of honey to reduce your cough     When to seek care  Please be seen in a clinic or urgent care if any of the following occur:     Symptoms do not start to improve after 3 days of treatment    New symptoms develop, or symptoms become worse     It is possible to have an allergic reaction to an antibiotic even if you have not had one in the past. If you notice a new rash, significant swelling, or difficulty breathing, stop taking this medication immediately and go to a clinic or urgent care.   Diagnosis: Acute bacterial sinusitis  Diagnosis ICD: J01.90  Prescription: doxycycline hyclate 100 mg oral tablet 14 tablet, 7 days supply. Take 1 tablet by mouth 2 times per day for 7 days. Refills: 0, Refill as needed: no, Allow substitutions: yes  Prescription: fluticasone 50 mcg/actuation nasal spray,suspension 1 120 spray aerosol with adapter (grams), 30 days supply. Inhale 2 sprays in each nostril 1 time per day; after 1 week, may adjust to 1 - 2 sprays in each nostril 1 time per day.. Refills: 0, Refill as needed: no, Allow substitutions: yes  Pharmacy: [id number] - [phone] - [address] [place] [address]

## 2020-02-05 ENCOUNTER — OFFICE VISIT (OUTPATIENT)
Dept: OPTOMETRY | Facility: CLINIC | Age: 40
End: 2020-02-05
Payer: COMMERCIAL

## 2020-02-05 DIAGNOSIS — H52.13 MYOPIA OF BOTH EYES: ICD-10-CM

## 2020-02-05 DIAGNOSIS — Z98.890 S/P LASIK SURGERY: ICD-10-CM

## 2020-02-05 DIAGNOSIS — H52.223 REGULAR ASTIGMATISM OF BOTH EYES: ICD-10-CM

## 2020-02-05 DIAGNOSIS — Z01.01 ENCOUNTER FOR EXAMINATION OF EYES AND VISION WITH ABNORMAL FINDINGS: Primary | ICD-10-CM

## 2020-02-05 PROCEDURE — 92014 COMPRE OPH EXAM EST PT 1/>: CPT | Performed by: OPTOMETRIST

## 2020-02-05 PROCEDURE — 92015 DETERMINE REFRACTIVE STATE: CPT | Performed by: OPTOMETRIST

## 2020-02-05 ASSESSMENT — CONF VISUAL FIELD
OS_NORMAL: 1
OD_NORMAL: 1

## 2020-02-05 ASSESSMENT — REFRACTION_MANIFEST
OD_SPHERE: -0.50
OS_AXIS: 090
OS_CYLINDER: +0.50
OS_SPHERE: -0.50
OD_CYLINDER: +0.50
OD_AXIS: 082

## 2020-02-05 ASSESSMENT — EXTERNAL EXAM - RIGHT EYE: OD_EXAM: NORMAL

## 2020-02-05 ASSESSMENT — VISUAL ACUITY
OS_SC+: -1
OS_SC: 20/20
OS_SC: 20/30 -1
OD_SC: 20/20
OD_SC: 20/20
METHOD: SNELLEN - LINEAR

## 2020-02-05 ASSESSMENT — TONOMETRY
OD_IOP_MMHG: 17
OS_IOP_MMHG: 17
IOP_METHOD: APPLANATION

## 2020-02-05 ASSESSMENT — EXTERNAL EXAM - LEFT EYE: OS_EXAM: NORMAL

## 2020-02-05 ASSESSMENT — SLIT LAMP EXAM - LIDS
COMMENTS: NORMAL
COMMENTS: NORMAL

## 2020-02-05 ASSESSMENT — CUP TO DISC RATIO
OD_RATIO: 0.25
OS_RATIO: 0.25

## 2020-02-05 NOTE — PROGRESS NOTES
Chief Complaint   Patient presents with     Annual Eye Exam      Accompanied by self  Last Eye Exam: 1.5 years ago  Dilated Previously: Yes    What are you currently using to see?  does not use glasses or contacts       Distance Vision Acuity: Satisfied with vision    Near Vision Acuity: Satisfied with vision while reading  unaided    Eye Comfort: dry  Do you use eye drops? : No  Occupation or Hobbies: physical therapy    Mariaelena Carrero, Optometry Tech          Medical, surgical and family histories reviewed and updated 2/5/2020.       OBJECTIVE: See Ophthalmology exam    ASSESSMENT:    ICD-10-CM    1. Encounter for examination of eyes and vision with abnormal findings Z01.01    2. S/P LASIK surgery Z98.890    3. Myopia of both eyes H52.13    4. Regular astigmatism of both eyes H52.223       PLAN:     Patient Instructions   No glasses prescription is necessary at this time.     Ocular health within normal limits.     Return for comprehensive eye exam in 1-2 years, or sooner if needed.     The effects of the dilating drops last for 4- 6 hours.  You will be more sensitive to light and vision will be blurry up close.  Mydriatic sunglasses were given if needed.      Juan No O.D.  [facility]  [address] [place] [address]. [place]  [address]    [phone]

## 2020-02-05 NOTE — PATIENT INSTRUCTIONS
No glasses prescription is necessary at this time.     Ocular health within normal limits.     Return for comprehensive eye exam in 1-2 years, or sooner if needed.     The effects of the dilating drops last for 4- 6 hours.  You will be more sensitive to light and vision will be blurry up close.  Mydriatic sunglasses were given if needed.      Juan No O.D.  [facility] [place]  [address] [place] [address]. [place]  [place]  [address]    [phone]

## 2020-02-05 NOTE — LETTER
2/5/2020         RE: Yonis Mcnair  [address]  [facility] [place] [address]        Dear Colleague,    Thank you for referring your patient, Yonis Mcnair, to the [facility]. Please see a copy of my visit note below.    Chief Complaint   Patient presents with     Annual Eye Exam      Accompanied by self  Last Eye Exam: 1.5 years ago  Dilated Previously: Yes    What are you currently using to see?  does not use glasses or contacts       Distance Vision Acuity: Satisfied with vision    Near Vision Acuity: Satisfied with vision while reading  unaided    Eye Comfort: dry  Do you use eye drops? : No  Occupation or Hobbies: physical therapy    Mariaelena Carrero, Optometry Tech          Medical, surgical and family histories reviewed and updated 2/5/2020.       OBJECTIVE: See Ophthalmology exam    ASSESSMENT:    ICD-10-CM    1. Encounter for examination of eyes and vision with abnormal findings Z01.01    2. S/P LASIK surgery Z98.890    3. Myopia of both eyes H52.13    4. Regular astigmatism of both eyes H52.223       PLAN:     Patient Instructions   No glasses prescription is necessary at this time.     Ocular health within normal limits.     Return for comprehensive eye exam in 1-2 years, or sooner if needed.     The effects of the dilating drops last for 4- 6 hours.  You will be more sensitive to light and vision will be blurry up close.  Mydriatic sunglasses were given if needed.      Juan No O.D.  [facility]  [address] [place] [address]. [place]  [address]    [phone]             Again, thank you for allowing me to participate in the care of your patient.        Sincerely,        Juan No, OD

## 2020-07-21 ENCOUNTER — VIRTUAL VISIT (OUTPATIENT)
Dept: FAMILY MEDICINE | Facility: CLINIC | Age: 40
End: 2020-07-21
Payer: COMMERCIAL

## 2020-07-21 DIAGNOSIS — L65.9 ALOPECIA: ICD-10-CM

## 2020-07-21 DIAGNOSIS — F43.9 STRESS: Primary | ICD-10-CM

## 2020-07-21 PROCEDURE — 99214 OFFICE O/P EST MOD 30 MIN: CPT | Mod: TEL | Performed by: FAMILY MEDICINE

## 2020-07-21 RX ORDER — FINASTERIDE 5 MG/1
TABLET, FILM COATED ORAL
Qty: 23 TABLET | Refills: 4 | Status: SHIPPED | OUTPATIENT
Start: 2020-07-21 | End: 2021-09-22

## 2020-07-21 NOTE — PROGRESS NOTES
"Yonis Mcnair is a 39 year old male who is being evaluated via a billable telephone visit.      The patient has been notified of following:     \"This telephone visit will be conducted via a call between you and your physician/provider. We have found that certain health care needs can be provided without the need for a physical exam.  This service lets us provide the care you need with a short phone conversation.  If a prescription is necessary we can send it directly to your pharmacy.  If lab work is needed we can place an order for that and you can then stop by our lab to have the test done at a later time.    Telephone visits are billed at different rates depending on your insurance coverage. During this emergency period, for some insurers they may be billed the same as an in-person visit.  Please reach out to your insurance provider with any questions.    If during the course of the call the physician/provider feels a telephone visit is not appropriate, you will not be charged for this service.\"    Patient has given verbal consent for Telephone visit?  Yes    What phone number would you like to be contacted at? [phone]    How would you like to obtain your AVS? MyChart    Subjective     Yonis Mcnair is a 39 year old male who presents via phone visit today for the following health issues:    HPI    Medication Followup of Finasteride    Taking Medication as prescribed: yes    Side Effects:  None    Medication Helping Symptoms:  yes        On the finasteride     1/4 pill daily     No side effects    Gets occasional palpitations/ dyspnea  Patient thinks anxiety    Feels fine when exercising     More anxiety recently    Still functioning fine             Reviewed and updated as needed this visit by Provider         Review of Systems   See above    Wt up just a bit per patient        Objective   Reported vitals:  There were no vitals taken for this visit.   healthy, alert and no distress  PSYCH: Alert and " oriented times 3; coherent speech, normal   rate and volume, able to articulate logical thoughts, able   to abstract reason, no tangential thoughts, no hallucinations   or delusions  His affect is normal  RESP: No cough, no audible wheezing, able to talk in full sentences  Remainder of exam unable to be completed due to telephone visits    Diagnostic Test Results:  Labs reviewed in Epic        Assessment/Plan:    1. Alopecia  Med working fine. No side effects.  Refills sent in.  Works well to have 23 pills per prescription.  - finasteride (PROSCAR) 5 MG tablet; TAKE 1/4 TABLET BY MOUTH DAILY  Dispense: 23 tablet; Refill: 4    2. Stress  Patient dealing with this okay.  No meds needed.       No follow-ups on file.      Phone call duration:  9 minutes ( 7:01 to 7:10 am )    Leo Sutton MD

## 2020-12-06 ENCOUNTER — HEALTH MAINTENANCE LETTER (OUTPATIENT)
Age: 40
End: 2020-12-06

## 2021-05-14 NOTE — TELEPHONE ENCOUNTER
Anal skin tag  RECORDS RECEIVED FROM: EPIC   DATE RECEIVED:    NOTES STATUS DETAILS   OFFICE NOTE from referring provider  N/A    OFFICE NOTE from other specialist   N/A    DISCHARGE SUMMARY from hospital  N/A    DISCHARGE REPORT from the ER N/A    OPERATIVE REPORT  N/A    MEDICATION LIST Internal    LABS     PFC TESTING N/A    ANAL PAP N/A    BIOPSIES/PATHOLOGY RELATED TO DIAGNOSIS N/A    DIAGNOSTIC PROCEDURES     COLONOSCOPY N/A    UPPER ENDOSCOPY (EGD) N/A    FLEX SIGMOIDOSCOPY  N/A    ERCP N/A    IMAGING (DISC & REPORT)      CT  N/A    MRI N/A    XRAY N/A    ULTRASOUND (ENDOANAL/ENDORECTAL) N/A

## 2021-07-25 ASSESSMENT — ENCOUNTER SYMPTOMS
STIFFNESS: 1
JOINT SWELLING: 0
MUSCLE WEAKNESS: 0
NECK PAIN: 1
MYALGIAS: 0
ARTHRALGIAS: 0
BACK PAIN: 1

## 2021-07-28 ENCOUNTER — OFFICE VISIT (OUTPATIENT)
Dept: SURGERY | Facility: CLINIC | Age: 41
End: 2021-07-28
Payer: COMMERCIAL

## 2021-07-28 ENCOUNTER — PRE VISIT (OUTPATIENT)
Dept: SURGERY | Facility: CLINIC | Age: 41
End: 2021-07-28

## 2021-07-28 VITALS
DIASTOLIC BLOOD PRESSURE: 88 MMHG | SYSTOLIC BLOOD PRESSURE: 135 MMHG | HEART RATE: 80 BPM | BODY MASS INDEX: 25.52 KG/M2 | HEIGHT: 72 IN | OXYGEN SATURATION: 98 % | WEIGHT: 188.4 LBS

## 2021-07-28 DIAGNOSIS — A63.0 ANAL CONDYLOMATA: Primary | ICD-10-CM

## 2021-07-28 DIAGNOSIS — A63.0 ANAL CONDYLOMA: Primary | ICD-10-CM

## 2021-07-28 PROCEDURE — 99203 OFFICE O/P NEW LOW 30 MIN: CPT | Performed by: NURSE PRACTITIONER

## 2021-07-28 ASSESSMENT — MIFFLIN-ST. JEOR: SCORE: 1797.58

## 2021-07-28 ASSESSMENT — PAIN SCALES - GENERAL: PAINLEVEL: NO PAIN (0)

## 2021-07-28 NOTE — NURSING NOTE
Chief Complaint   Patient presents with     Consult     anal skin tag       Vitals:    07/28/21 1006   BP: 135/88   BP Location: Left arm   Patient Position: Sitting   Cuff Size: Adult Regular   Pulse: 80   SpO2: 98%   Weight: 188 lb 6.4 oz   Height: 6'       Body mass index is 25.55 kg/m .    Sharri Grande CMA

## 2021-07-28 NOTE — PROGRESS NOTES
Colon and Rectal Surgery Consult Clinic Note    Date: 2021     Referring provider:  Referred Self, MD  No address on file     RE: Yonis Mcnair  : 1980  MALIK: 2021    Yonis Mcnair is a very pleasant 40 year old male who presents today for anal skin tag.    HPI:  Yonis reports that he has had a skin tag for over three years. This occurred after he developed a large external hemorrhoid, which he gets intermittently. This is asymptomatic. He is otherwise healthy. He smokes occasionally. He rarely has anoreceptive intercourse.    Physical Examination: Exam was chaperoned by Sharri Grande MA   /88 (BP Location: Left arm, Patient Position: Sitting, Cuff Size: Adult Regular)   Pulse 80   Ht 6'   Wt 188 lb 6.4 oz   SpO2 98%   BMI 25.55 kg/m    General: alert, oriented, in no acute distress, sitting comfortably  HEENT: mucous membranes moist  Perianal external examination:  Verruciform lesion on narrow stalk in the left lateral position.  Digital rectal examination: Was performed.   Sphincter tone: Good.  Palpable lesions: No.  Prostate: Normal.  Other: None.    Anoscopy: Was performed.   Hemorrhoids: No significant internal hemorrhoids.  Lesions: Yes: small scattered verruciform lesions throughout the anal canal    Assessment/Plan: 40 year old male with perianal and intra anal warts. Discussed the nature of the HPV virus and recurrent nature of warts. Recommended smoking cessation to try to minimize risk of recurrence. Advised examination under anesthesia with fulguration of warts. Will follow every 6 months for wart checks after that. Patient's questions were answered to his stated satisfaction and he is in agreement with this plan.    Medical history:  Past Medical History:   Diagnosis Date     Dyspnea on exertion      Impaired fasting glucose 2017     Pain in right shoulder     SLAP TEAR     PONV (postoperative nausea and vomiting)        Surgical history:  Past Surgical History:    Procedure Laterality Date     ARTHROSCOPY HIP, OSTEOPLASTY FEMUR PROXIMAL, COMBINED  1/14/2013    Procedure: COMBINED ARTHROSCOPY HIP, OSTEOPLASTY FEMUR PROXIMAL;  Left Hip Arthroscopy, Cam Correction, Labral Repair ,;  Surgeon: Stanley Edwards MD;  [facility]     ARTHROSCOPY SHOULDER SUPERIOR LABRUM ANTERIOR TO POSTERIOR REPAIR  12/30/2011    Procedure:ARTHROSCOPY SHOULDER SUPERIOR LABRUM ANTERIOR TO POSTERIOR REPAIR; Right Shoulder Arthroscopy,   Superior Labral Anteroposterior Repair, Distal Clavicle Excision              ; Surgeon:JAI HERRERA; [facility]     C SHOULDER SURG PROC UNLISTED       HERNIA REPAIR, UMBILICAL  1979     LASIK Bilateral March 2011       Problem list:  Patient Active Problem List    Diagnosis Date Noted     Impaired fasting glucose 05/17/2017     Priority: Medium     S/P LASIK surgery 04/27/2016     Priority: Medium     Hip pain, chronic, left 10/07/2015     Priority: Medium     Alopecia 03/11/2015     Priority: Medium     Problem list name updated by automated process. Provider to review         Medications:  Current Outpatient Medications   Medication Sig Dispense Refill     finasteride (PROSCAR) 5 MG tablet TAKE 1/4 TABLET BY MOUTH DAILY (Patient taking differently: Take 1 mg by mouth daily TAKE 1/4 TABLET BY MOUTH DAILY) 23 tablet 4     Fish Oil-Cholecalciferol (FISH OIL + D3 PO)        Multiple Vitamins-Minerals (MULTIVITAMIN ADULT PO) Take by mouth daily         Allergies:  Allergies   Allergen Reactions     Amoxicillin Rash       Family history:  Family History   Problem Relation Age of Onset     Lipids Father      Allergies Mother      C.A.D. Maternal Uncle      Musculoskeletal Disorder Paternal Grandfather      Heart Disease Paternal Grandfather      Asthma Maternal Grandfather      Thyroid Disease Paternal Grandmother      Diabetes Paternal Grandmother      Depression Paternal Grandmother      Breast Cancer Maternal Grandmother      Cancer Maternal  Grandmother      Hypertension No family hx of      Cerebrovascular Disease No family hx of      Glaucoma No family hx of      Macular Degeneration No family hx of        Social history:  Social History     Tobacco Use     Smoking status: Former Smoker     Types: Cigarettes     Smokeless tobacco: Former User   Substance Use Topics     Alcohol use: Yes     Alcohol/week: 10.0 standard drinks     Comment: 5-10DRINKS PER WEEK     Marital status: .  Occupation: physical therapist.    Nursing Notes:   Sharri Grande  7/28/2021 10:09 AM  Signed  Chief Complaint   Patient presents with     Consult     anal skin tag       Vitals:    07/28/21 1006   BP: 135/88   BP Location: Left arm   Patient Position: Sitting   Cuff Size: Adult Regular   Pulse: 80   SpO2: 98%   Weight: 188 lb 6.4 oz   Height: 6'       Body mass index is 25.55 kg/m .    Sharri Grande CMA         30 minutes spent on the date of the encounter doing chart review, history and exam, documentation and further activities as noted above.     GERARDO Villanueva, NP-C  Colon and Rectal Surgery   [facility]    This note was created using speech recognition software and may contain unintended word substitutions.

## 2021-07-28 NOTE — LETTER
2021       RE: Yonis Mcnair  [address]  [place] [address]     Dear Colleague,    Thank you for referring your patient, Yonis Mcnair, to the [facility] [place] [facility]. Please see a copy of my visit note below.    Colon and Rectal Surgery Consult Clinic Note    Date: 2021     Referring provider:  Referred Self, MD  No address on file     RE: Yonis Mcnair  : 1980  MALIK: 2021    Yonis Mcnair is a very pleasant 40 year old male who presents today for anal skin tag.    HPI:  Yonis reports that he has had a skin tag for over three years. This occurred after he developed a large external hemorrhoid, which he gets intermittently. This is asymptomatic. He is otherwise healthy. He smokes occasionally. He rarely has anoreceptive intercourse.    Physical Examination: Exam was chaperoned by Sharri Grande MA   /88 (BP Location: Left arm, Patient Position: Sitting, Cuff Size: Adult Regular)   Pulse 80   Ht 6'   Wt 188 lb 6.4 oz   SpO2 98%   BMI 25.55 kg/m    General: alert, oriented, in no acute distress, sitting comfortably  HEENT: mucous membranes moist  Perianal external examination:  Verruciform lesion on narrow stalk in the left lateral position.  Digital rectal examination: Was performed.   Sphincter tone: Good.  Palpable lesions: No.  Prostate: Normal.  Other: None.    Anoscopy: Was performed.   Hemorrhoids: No significant internal hemorrhoids.  Lesions: Yes: small scattered verruciform lesions throughout the anal canal    Assessment/Plan: 40 year old male with perianal and intra anal warts. Discussed the nature of the HPV virus and recurrent nature of warts. Recommended smoking cessation to try to minimize risk of recurrence. Advised examination under anesthesia with fulguration of warts. Will follow every 6 months for wart checks after that. Patient's questions  were answered to his stated satisfaction and he is in agreement with this plan.    Medical history:  Past Medical History:   Diagnosis Date     Dyspnea on exertion      Impaired fasting glucose 5/17/2017     Pain in right shoulder     SLAP TEAR     PONV (postoperative nausea and vomiting)        Surgical history:  Past Surgical History:   Procedure Laterality Date     ARTHROSCOPY HIP, OSTEOPLASTY FEMUR PROXIMAL, COMBINED  1/14/2013    Procedure: COMBINED ARTHROSCOPY HIP, OSTEOPLASTY FEMUR PROXIMAL;  Left Hip Arthroscopy, Cam Correction, Labral Repair ,;  Surgeon: Stanley Edwards MD;  [facility]     ARTHROSCOPY SHOULDER SUPERIOR LABRUM ANTERIOR TO POSTERIOR REPAIR  12/30/2011    Procedure:ARTHROSCOPY SHOULDER SUPERIOR LABRUM ANTERIOR TO POSTERIOR REPAIR; Right Shoulder Arthroscopy,   Superior Labral Anteroposterior Repair, Distal Clavicle Excision              ; Surgeon:JAI HERRERA; [facility]     C SHOULDER SURG PROC UNLISTED       HERNIA REPAIR, UMBILICAL  1979     LASIK Bilateral March 2011       Problem list:  Patient Active Problem List    Diagnosis Date Noted     Impaired fasting glucose 05/17/2017     Priority: Medium     S/P LASIK surgery 04/27/2016     Priority: Medium     Hip pain, chronic, left 10/07/2015     Priority: Medium     Alopecia 03/11/2015     Priority: Medium     Problem list name updated by automated process. Provider to review         Medications:  Current Outpatient Medications   Medication Sig Dispense Refill     finasteride (PROSCAR) 5 MG tablet TAKE 1/4 TABLET BY MOUTH DAILY (Patient taking differently: Take 1 mg by mouth daily TAKE 1/4 TABLET BY MOUTH DAILY) 23 tablet 4     Fish Oil-Cholecalciferol (FISH OIL + D3 PO)        Multiple Vitamins-Minerals (MULTIVITAMIN ADULT PO) Take by mouth daily         Allergies:  Allergies   Allergen Reactions     Amoxicillin Rash       Family history:  Family History   Problem Relation Age of Onset     Lipids Father      Allergies  Mother      C.A.D. Maternal Uncle      Musculoskeletal Disorder Paternal Grandfather      Heart Disease Paternal Grandfather      Asthma Maternal Grandfather      Thyroid Disease Paternal Grandmother      Diabetes Paternal Grandmother      Depression Paternal Grandmother      Breast Cancer Maternal Grandmother      Cancer Maternal Grandmother      Hypertension No family hx of      Cerebrovascular Disease No family hx of      Glaucoma No family hx of      Macular Degeneration No family hx of        Social history:  Social History     Tobacco Use     Smoking status: Former Smoker     Types: Cigarettes     Smokeless tobacco: Former User   Substance Use Topics     Alcohol use: Yes     Alcohol/week: 10.0 standard drinks     Comment: 5-10DRINKS PER WEEK     Marital status: .  Occupation: physical therapist.    Nursing Notes:   Sharri Grande  7/28/2021 10:09 AM  Signed  Chief Complaint   Patient presents with     Consult     anal skin tag       Vitals:    07/28/21 1006   BP: 135/88   BP Location: Left arm   Patient Position: Sitting   Cuff Size: Adult Regular   Pulse: 80   SpO2: 98%   Weight: 188 lb 6.4 oz   Height: 6'       Body mass index is 25.55 kg/m .    Sharri Grande CMA         30 minutes spent on the date of the encounter doing chart review, history and exam, documentation and further activities as noted above.     GERARDO Villanueva, NP-C  Colon and Rectal Surgery   [facility]    This note was created using speech recognition software and may contain unintended word substitutions.        Again, thank you for allowing me to participate in the care of your patient.      Sincerely,    GERARDO Villanueva CNP

## 2021-08-04 ENCOUNTER — TELEPHONE (OUTPATIENT)
Dept: SURGERY | Facility: CLINIC | Age: 41
End: 2021-08-04

## 2021-08-04 PROBLEM — A63.0 ANAL CONDYLOMA: Status: ACTIVE | Noted: 2021-08-04

## 2021-08-04 NOTE — TELEPHONE ENCOUNTER
"Tier 3 Case Request received to schedule:    Patient Name: Yonis Mcnair   MRN: [id number]   Case#: [id number]   Surgeon(s) and Role:      * Isra Millan MD - Primary   Date requested: * No dates entered *   [place] [facility] [place]   Procedure(s):   EXAM UNDER ANESTHESIA, excision and fulguration of anal condyloma (N/A)   FULGURATION, CONDYLOMA, RECTUM (N/A)     Additional Instructions for the Case  Multi-surgeon case:  no  Time in minutes:  30  Anesthesia: MAC  Prep: 2 fleets  Pre-Op: PAC vc PCP  Labs: no  WOC: no  Special equipment: no  Special Instructions: no     On 8/4/2021:  Spoke with patient via phone, completed the following scheduling, then sent Surgery Packet to patient via [employer] including the following info:     Required: Yes, you will need a  18 years or older to drive you home from your procedure as well as stay with you for 24 hours after your procedure    Surgery: Exam under anesthesia, excision and fulguration of anal condyloma     Date: Friday October 15, 2021               Surgeon: Dr. Isra Millan    Time: You will receive a call 1-3 days prior to your surgery with your finalized surgery and arrival time.     Location: [facility] - [address]--[address]th Floor  [place], [place] [address] [phone]      Preparation: 2 Fleet Enemas (See \"Day of Surgery\")    Upcoming Appointments:     Pre-operative COVID-19 Test:  Pre-procedure asymptomatic COVID PCR:  10/13/2021 at 11:00 AM                                                                      [facility] Lab                                                                         [address] [place] [id number]    Pre-operative History & Physical appointment:   - clinic appointment to be scheduled/ completed by you with your primary care provider, or a partner, within 30 days " before your surgery date

## 2021-08-18 ENCOUNTER — E-VISIT (OUTPATIENT)
Dept: URGENT CARE | Facility: URGENT CARE | Age: 41
End: 2021-08-18
Payer: COMMERCIAL

## 2021-08-18 DIAGNOSIS — Z20.822 SUSPECTED COVID-19 VIRUS INFECTION: ICD-10-CM

## 2021-08-18 DIAGNOSIS — Z20.822 SUSPECTED COVID-19 VIRUS INFECTION: Primary | ICD-10-CM

## 2021-08-18 LAB — SARS-COV-2 RNA RESP QL NAA+PROBE: NEGATIVE

## 2021-08-18 PROCEDURE — 99421 OL DIG E/M SVC 5-10 MIN: CPT | Performed by: PHYSICIAN ASSISTANT

## 2021-08-18 PROCEDURE — U0005 INFEC AGEN DETEC AMPLI PROBE: HCPCS

## 2021-08-18 PROCEDURE — U0003 INFECTIOUS AGENT DETECTION BY NUCLEIC ACID (DNA OR RNA); SEVERE ACUTE RESPIRATORY SYNDROME CORONAVIRUS 2 (SARS-COV-2) (CORONAVIRUS DISEASE [COVID-19]), AMPLIFIED PROBE TECHNIQUE, MAKING USE OF HIGH THROUGHPUT TECHNOLOGIES AS DESCRIBED BY CMS-2020-01-R: HCPCS

## 2021-08-18 NOTE — PATIENT INSTRUCTIONS
Dear Yonis Mcnair,    Your symptoms show that you may have coronavirus (COVID-19). This illness can cause fever, cough and trouble breathing. Many people get a mild case and get better on their own. Some people can get very sick.    Will I be tested for COVID-19?  We would like to test you for Covid-19 virus. I have placed orders for this test.     For all employees or close contacts (except [employer] [place] and [employer] - see below), go to your [employer] home page and scroll down to the section that says  You have an appointment that needs to be scheduled  and click the large green button that says  Schedule Now  and follow the steps to find the next available opening.     If you are unable to complete these steps or if you cannot find any available times, please call [phone] to schedule employee testing.     [place] employees or close contacts, please call [phone].   [place] ([employer]) employees or close contacts call [phone].    Return to work/school/ guidance:  Please let your workplace manager and staffing office know when your quarantine ends     We can t give you an exact date as it depends on the above. You can calculate this on your own or work with your manager/staffing office to calculate this. (For example if you were exposed on 10/4, you would have to quarantine for 14 full days. That would be through 10/18. You could return on 10/19.)      If you receive a positive COVID-19 test result, follow the guidance of the those who are giving you the results. Usually the return to work is 10 (or in some cases 20 days from symptom onset.) If you work at [employer], you must also be cleared by Employee Occupational Health and Safety to return to work.        If you receive a negative COVID-19 test result and did not have a high risk exposure to someone with a known positive COVID-19 test, you can return to work once you're free of fever for 24 hours without fever-reducing medication and  your symptoms are improving or resolved.      If you receive a negative COVID-19 test and If you had a high risk exposure to someone who has tested positive for COVID-19 then you can return to work 14 days after your last contact with the positive individual    Note: If you have ongoing exposure to the covid positive person, this quarantine period may be more than 14 days. (For example, if you are continued to be exposed to your child who tested positive and cannot isolate from them, then the quarantine of 7-14 days can't start until your child is no longer contagious. This is typically 10 days from onset of the child's symptoms. So the total duration may be 17-24 days in this case.)    Sign up for [email or web address].   We know it's scary to hear that you might have COVID-19. We want to track your symptoms to make sure you're okay over the next 2 weeks. Please look for an email from [email or web address]--this is a free, online program that we'll use to keep in touch. To sign up, follow the link in the email you will receive. Learn more at [email or web address]    How can I take care of myself?    Get lots of rest. Drink extra fluids (unless a doctor has told you not to)    Take Tylenol (acetaminophen) or ibuprofen for fever or pain. If you have liver or kidney problems, ask your family doctor if it's okay to take Tylenol o ibuprofen    If you have other health problems (like cancer, heart failure, an organ transplant or severe kidney disease): Call your specialty clinic if you don't feel better in the next 2 days.    Know when to call 911. Emergency warning signs include:  o Trouble breathing or shortness of breath  o Pain or pressure in the chest that doesn't go away  o Feeling confused like you haven't felt before, or not being able to wake up  o Bluish-colored lips or face    Where can I get more information?   [employer] - About COVID-19:   [email or web address]    CDC - What to Do If You're Sick:    www.cdc.gov/coronavirus/2019-ncov/about/steps-when-sick.html    August 18, 2021  RE:  Yonis Mcnair                                                                                                                  [address] [facility] [address]      To whom it may concern:    I evaluated Yonis Mcnair on August 18, 2021. Yonis Mcnair should be excused from work/school.     They should let their workplace manager and staffing office know when their quarantine ends.    We can not give an exact date as it depends on the information below. They can calculate this on their own or work with their manager/staffing office to calculate this. (For example if they were exposed on 10/04, they would have to quarantine for 14 full days. That would be through 10/18. They could return on 10/19.)    Quarantine Guidelines:      If patient receives a positive COVID-19 test result, they should follow the guidance of those who are giving the results. Usually the return to work is 10 (or in some cases 20 days from symptom onset.) If they work at [employer], they must be cleared by Employee Occupational Health and Safety to return to work.        If patient receives a negative COVID-19 test result and did not have a high risk exposure to someone with a known positive COVID-19 test, they can return to work once they're free of fever for 24 hours without fever-reducing medication and their symptoms are improving or resolved.      If patient receives a negative COVID-19 test and if they had a high risk exposure to someone who has tested positive for COVID-19 then they can return to work 14 days after their last contact with the positive individual    Note: If there is ongoing exposure to the covid positive person, this quarantine period may be longer than 14 days. (For example, if they are continually exposed to their child, who tested positive and cannot isolate from them, then the quarantine of 7-14 days  can't start until their child is no longer contagious. This is typically 10 days from onset to the child's symptoms. So the total duration may be 17-24 days in this case.)     Sincerely,  Rosario Platt PA-C

## 2021-08-20 ENCOUNTER — TELEPHONE (OUTPATIENT)
Dept: SURGERY | Facility: CLINIC | Age: 41
End: 2021-08-20

## 2021-08-20 NOTE — TELEPHONE ENCOUNTER
"Left vm msg for return call for patient updating him that surgery with Dr. Isra Millan that was scheduled on 10/15/2021 needs to be rescheduled due to the surgeon has been called out-of-town that week.     I also sent patient the following [employer] message:    \"Rush Somers,    I just left you a voicemail message regarding an unfortunate development - Dr. Isra Millan has been called out of town the week of 10/15/2021 when your surgery was scheduled.    So, unfortunately we will have to reschedule it. I saw that your pre-op physical appointment is scheduled on 2021, so I wanted to be sure to keep your surgery date within 30 days of that appointment so that it does not  before your surgery date.    Because of this, I moved your surgery date to new tentative date Friday 10/8/2021 at tentative start time 8:45 AM, tentative arrival time 7:15 AM.     Please reply or call to confirm if this new scheduling will work for you.    Thank-you,    Mary   Lisa-op Coordinator  [facility]  Direct Phone: [phone]     ------------------------------------------    BEFORE YOUR SURGERY     Required: Yes, you will need a  18 years or older to drive you home from your procedure as well as stay with you for 24 hours after your procedure    Surgery: Exam under anesthesia, excision and fulguration of anal condyloma    Date: 2021          Surgeon: Dr. Isra Millan    Time: You will receive a call 1-3 days prior to your surgery with your finalized surgery and arrival time.     Location: [facility] - [address] [address]  [phone]      Pre-surgical Preparation: 2 Fleet Enemas (see \"Day of Surgery\")    Upcoming Appointments:     Pre-operative History & Physical appointment:   Clinic appointment with Leo Sutton MD:  2021 at 11:20 AM                                                 [facility] " "[address] [address]    Pre-operative COVID-19 Test:  Pre-procedure asymptomatic COVID PCR:  10/6/2021 at 11:00 AM                                                                      [facility] [place] [address] Lab                                                                      [address] [place] [phone]\"  "

## 2021-09-03 DIAGNOSIS — Z11.59 ENCOUNTER FOR SCREENING FOR OTHER VIRAL DISEASES: ICD-10-CM

## 2021-09-22 ENCOUNTER — OFFICE VISIT (OUTPATIENT)
Dept: FAMILY MEDICINE | Facility: CLINIC | Age: 41
End: 2021-09-22
Payer: COMMERCIAL

## 2021-09-22 VITALS
WEIGHT: 185.13 LBS | BODY MASS INDEX: 25.11 KG/M2 | DIASTOLIC BLOOD PRESSURE: 85 MMHG | TEMPERATURE: 97.5 F | HEART RATE: 73 BPM | SYSTOLIC BLOOD PRESSURE: 128 MMHG

## 2021-09-22 DIAGNOSIS — L65.9 ALOPECIA: ICD-10-CM

## 2021-09-22 DIAGNOSIS — R73.01 IMPAIRED FASTING GLUCOSE: ICD-10-CM

## 2021-09-22 DIAGNOSIS — Z01.818 PREOP GENERAL PHYSICAL EXAM: Primary | ICD-10-CM

## 2021-09-22 DIAGNOSIS — A63.0 ANAL CONDYLOMA: ICD-10-CM

## 2021-09-22 DIAGNOSIS — Z12.5 SCREENING FOR PROSTATE CANCER: ICD-10-CM

## 2021-09-22 DIAGNOSIS — E78.5 HYPERLIPIDEMIA LDL GOAL <100: ICD-10-CM

## 2021-09-22 LAB
ALBUMIN SERPL-MCNC: 4.2 G/DL (ref 3.4–5)
ALP SERPL-CCNC: 83 U/L (ref 40–150)
ALT SERPL W P-5'-P-CCNC: 60 U/L (ref 0–70)
ANION GAP SERPL CALCULATED.3IONS-SCNC: 3 MMOL/L (ref 3–14)
AST SERPL W P-5'-P-CCNC: 23 U/L (ref 0–45)
BASOPHILS # BLD AUTO: 0 10E3/UL (ref 0–0.2)
BASOPHILS NFR BLD AUTO: 1 %
BILIRUB SERPL-MCNC: 0.9 MG/DL (ref 0.2–1.3)
BUN SERPL-MCNC: 14 MG/DL (ref 7–30)
CALCIUM SERPL-MCNC: 9.3 MG/DL (ref 8.5–10.1)
CHLORIDE BLD-SCNC: 105 MMOL/L (ref 94–109)
CHOLEST SERPL-MCNC: 189 MG/DL
CO2 SERPL-SCNC: 30 MMOL/L (ref 20–32)
CREAT SERPL-MCNC: 0.99 MG/DL (ref 0.66–1.25)
EOSINOPHIL # BLD AUTO: 0.4 10E3/UL (ref 0–0.7)
EOSINOPHIL NFR BLD AUTO: 8 %
ERYTHROCYTE [DISTWIDTH] IN BLOOD BY AUTOMATED COUNT: 13.2 % (ref 10–15)
FASTING STATUS PATIENT QL REPORTED: YES
GFR SERPL CREATININE-BSD FRML MDRD: >90 ML/MIN/1.73M2
GLUCOSE BLD-MCNC: 110 MG/DL (ref 70–99)
HBA1C MFR BLD: 5.4 % (ref 0–5.6)
HCT VFR BLD AUTO: 45.7 % (ref 40–53)
HDLC SERPL-MCNC: 54 MG/DL
HGB BLD-MCNC: 16.3 G/DL (ref 13.3–17.7)
LDLC SERPL CALC-MCNC: 115 MG/DL
LYMPHOCYTES # BLD AUTO: 1.8 10E3/UL (ref 0.8–5.3)
LYMPHOCYTES NFR BLD AUTO: 36 %
MCH RBC QN AUTO: 31.2 PG (ref 26.5–33)
MCHC RBC AUTO-ENTMCNC: 35.7 G/DL (ref 31.5–36.5)
MCV RBC AUTO: 88 FL (ref 78–100)
MONOCYTES # BLD AUTO: 0.4 10E3/UL (ref 0–1.3)
MONOCYTES NFR BLD AUTO: 9 %
NEUTROPHILS # BLD AUTO: 2.3 10E3/UL (ref 1.6–8.3)
NEUTROPHILS NFR BLD AUTO: 46 %
NONHDLC SERPL-MCNC: 135 MG/DL
PLATELET # BLD AUTO: 223 10E3/UL (ref 150–450)
POTASSIUM BLD-SCNC: 4.8 MMOL/L (ref 3.4–5.3)
PROT SERPL-MCNC: 7.4 G/DL (ref 6.8–8.8)
PSA SERPL-MCNC: 0.81 UG/L (ref 0–4)
RBC # BLD AUTO: 5.22 10E6/UL (ref 4.4–5.9)
SODIUM SERPL-SCNC: 138 MMOL/L (ref 133–144)
TRIGL SERPL-MCNC: 101 MG/DL
WBC # BLD AUTO: 4.9 10E3/UL (ref 4–11)

## 2021-09-22 PROCEDURE — 83036 HEMOGLOBIN GLYCOSYLATED A1C: CPT | Performed by: FAMILY MEDICINE

## 2021-09-22 PROCEDURE — 80061 LIPID PANEL: CPT | Performed by: FAMILY MEDICINE

## 2021-09-22 PROCEDURE — 85025 COMPLETE CBC W/AUTO DIFF WBC: CPT | Performed by: FAMILY MEDICINE

## 2021-09-22 PROCEDURE — G0103 PSA SCREENING: HCPCS | Performed by: FAMILY MEDICINE

## 2021-09-22 PROCEDURE — 36415 COLL VENOUS BLD VENIPUNCTURE: CPT | Performed by: FAMILY MEDICINE

## 2021-09-22 PROCEDURE — 99214 OFFICE O/P EST MOD 30 MIN: CPT | Performed by: FAMILY MEDICINE

## 2021-09-22 PROCEDURE — 80053 COMPREHEN METABOLIC PANEL: CPT | Performed by: FAMILY MEDICINE

## 2021-09-22 RX ORDER — FINASTERIDE 5 MG/1
TABLET, FILM COATED ORAL
Qty: 23 TABLET | Refills: 4 | Status: SHIPPED | OUTPATIENT
Start: 2021-09-22 | End: 2022-12-14

## 2021-09-22 NOTE — PROGRESS NOTES
[facility]  [address] [place] [address]  [place] [address]  Phone: [phone]  Primary Provider: Davey King  Pre-op Performing Provider: CR DE LA VEGA       PREOPERATIVE EVALUATION:  Today's date: 9/22/2021    Yonis Mcnair is a 41 year old male who presents for a preoperative evaluation.    Surgical Information:  Surgery/Procedure: anal condyloma  Surgery Location:   Surgeon: Monty  Surgery Date: 10/01/2021  Time of Surgery: am  Where patient plans to recover: At home with family  Fax number for surgical facility: Note does not need to be faxed, will be available electronically in Epic.    Type of Anesthesia Anticipated: General         Subjective     HPI related to upcoming procedure:  Patient with anal condyloma for 3 years since had hemorrhoid.      Preop Questions 9/16/2021   1. Have you ever had a heart attack or stroke? No   2. Have you ever had surgery on your heart or blood vessels, such as a stent placement, a coronary artery bypass, or surgery on an artery in your head, neck, heart, or legs? No   3. Do you have chest pain with activity? No   4. Do you have a history of  heart failure? No   5. Do you currently have a cold, bronchitis or symptoms of other infection? No   6. Do you have a cough, shortness of breath, or wheezing? No   7. Do you or anyone in your family have previous history of blood clots? No   8. Do you or does anyone in your family have a serious bleeding problem such as prolonged bleeding following surgeries or cuts? No   9. Have you ever had problems with anemia or been told to take iron pills? No   10. Have you had any abnormal blood loss such as black, tarry or bloody stools? No   11. Have you ever had a blood transfusion? No   12. Are you willing to have a blood transfusion if it is medically needed before, during, or after your surgery? Yes   13. Have you or any of your relatives ever had problems with anesthesia? YES -some nausea   14. Do you  have sleep apnea, excessive snoring or daytime drowsiness? No   15. Do you have any artifical heart valves or other implanted medical devices like a pacemaker, defibrillator, or continuous glucose monitor? No   16. Do you have artificial joints? No   17. Are you allergic to latex? No       Health Care Directive:  Patient does have a Health Care Directive or Living Will:     Preoperative Review of :  Not on any controlled substances           Review of Systems  Constitutional, neuro, ENT, endocrine, pulmonary, cardiac, gastrointestinal, genitourinary, musculoskeletal, integument and psychiatric systems are negative, except as otherwise noted.    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 Provider to review        Past Medical History:   Diagnosis Date     Dyspnea on exertion      Impaired fasting glucose 5/17/2017     Pain in right shoulder     SLAP TEAR     PONV (postoperative nausea and vomiting)      Past Surgical History:   Procedure Laterality Date     ARTHROSCOPY HIP, OSTEOPLASTY FEMUR PROXIMAL, COMBINED  1/14/2013    Procedure: COMBINED ARTHROSCOPY HIP, OSTEOPLASTY FEMUR PROXIMAL;  Left Hip Arthroscopy, Cam Correction, Labral Repair ,;  Surgeon: Stanley Edwards MD;  [facility]     ARTHROSCOPY SHOULDER SUPERIOR LABRUM ANTERIOR TO POSTERIOR REPAIR  12/30/2011     Procedure:ARTHROSCOPY SHOULDER SUPERIOR LABRUM ANTERIOR TO POSTERIOR REPAIR; Right Shoulder Arthroscopy,   Superior Labral Anteroposterior Repair, Distal Clavicle Excision              ; Surgeon:JAI HERRERA; Location:[facility]     C SHOULDER SURG PROC UNLISTED       HERNIA REPAIR, UMBILICAL  1979     LASIK Bilateral March 2011     Current Outpatient Medications   Medication Sig Dispense Refill     finasteride (PROSCAR) 5 MG tablet TAKE 1/4 TABLET BY MOUTH DAILY (Patient taking differently: Take 1 mg by mouth daily TAKE 1/4 TABLET BY MOUTH DAILY) 23 tablet 4     Fish Oil-Cholecalciferol (FISH OIL + D3 PO)        Multiple Vitamins-Minerals (MULTIVITAMIN ADULT PO) Take by mouth daily         Allergies   Allergen Reactions     Amoxicillin Rash        Social History     Tobacco Use     Smoking status: Former Smoker     Types: Cigarettes     Smokeless tobacco: Former User   Substance Use Topics     Alcohol use: Yes     Alcohol/week: 10.0 standard drinks     Comment: 5-10DRINKS PER WEEK         History   Drug Use No         Objective     /89 (BP Location: Left arm, Patient Position: Chair, Cuff Size: Adult Regular)   Pulse 73   Temp 97.5  F (36.4  C) (Temporal)   Wt 84 kg (185 lb 2 oz)   BMI 25.11 kg/m      Physical Exam    GENERAL APPEARANCE: healthy, alert and no distress     EYES: EOMI,  PERRL     HENT: nose and mouth without ulcers or lesions and tympanic membranes not well seen due to cerumen     NECK: no adenopathy, no asymmetry, masses, or scars and thyroid normal to palpation     RESP: lungs clear to auscultation - no rales, rhonchi or wheezes     CV: regular rates and rhythm, normal S1 S2, no S3 or S4 and no murmur, click or rub     ABDOMEN:  soft, nontender, no HSM or masses and bowel sounds normal     MS: extremities normal- no gross deformities noted, no evidence of inflammation in joints, FROM in all extremities.     SKIN: no suspicious lesions or rashes     NEURO: Normal strength and tone,  sensory exam grossly normal, mentation intact and speech normal     PSYCH: mentation appears normal. and affect normal/bright     LYMPHATICS: No cervical adenopathy    No results for input(s): HGB, PLT, INR, NA, POTASSIUM, CR, A1C in the last 96871 hours.     Diagnostics:       Labs pending          ASSESSMENT / PLAN:  (Z01.818) Preop general physical exam  (primary encounter diagnosis)  Comment: patient okay for procedure if labs okay   Plan: Comprehensive metabolic panel, CBC with         Platelets & Differential             (A63.0) Anal condyloma  Comment: to have procedure  Oct  1  Plan: as above     (Z12.5) Screening for prostate cancer  Comment: check psa   Plan: Prostate Specific Antigen Screen             (R73.01) Impaired fasting glucose  Comment: check   Plan: Hemoglobin A1c             (E78.5) Hyperlipidemia LDL goal <100  Comment: mildly high in past   Plan: Lipid panel reflex to direct LDL Fasting             Also refill finasteride ( takes for hair loss )      I reviewed the patient's medications, allergies, medical history, family history, and social history.    Leo Sutton MD        Revised Cardiac Risk Index (RCRI):  The patient has the following serious cardiovascular risks for perioperative complications:   - No serious cardiac risks = 0 points     RCRI Interpretation: 0 points: Class I (very low risk - 0.4% complication rate)           Signed Electronically by: Leo Sutton MD  Copy of this evaluation report is provided to requesting physician.

## 2021-09-22 NOTE — PATIENT INSTRUCTIONS
We will send you lab results    Keep working on healthy diet/exercise     Reduce alcohol intake    No aspirin/ ibuprofen for the week prior to procedure

## 2021-09-23 NOTE — RESULT ENCOUNTER NOTE
"Your preop labs are fine; okay to proceed with procedure.    LDL \"bad\" cholesterol a bit high but not bad.  Keep working on healthy diet/exercise.    Leo Sutton MD  "

## 2021-09-25 ENCOUNTER — HEALTH MAINTENANCE LETTER (OUTPATIENT)
Age: 41
End: 2021-09-25

## 2021-09-29 ENCOUNTER — LAB (OUTPATIENT)
Dept: LAB | Facility: CLINIC | Age: 41
End: 2021-09-29
Payer: COMMERCIAL

## 2021-09-29 DIAGNOSIS — Z11.59 ENCOUNTER FOR SCREENING FOR OTHER VIRAL DISEASES: ICD-10-CM

## 2021-09-29 LAB — SARS-COV-2 RNA RESP QL NAA+PROBE: NEGATIVE

## 2021-09-29 PROCEDURE — U0005 INFEC AGEN DETEC AMPLI PROBE: HCPCS

## 2021-09-29 PROCEDURE — U0003 INFECTIOUS AGENT DETECTION BY NUCLEIC ACID (DNA OR RNA); SEVERE ACUTE RESPIRATORY SYNDROME CORONAVIRUS 2 (SARS-COV-2) (CORONAVIRUS DISEASE [COVID-19]), AMPLIFIED PROBE TECHNIQUE, MAKING USE OF HIGH THROUGHPUT TECHNOLOGIES AS DESCRIBED BY CMS-2020-01-R: HCPCS

## 2021-09-30 ENCOUNTER — ANESTHESIA EVENT (OUTPATIENT)
Dept: SURGERY | Facility: AMBULATORY SURGERY CENTER | Age: 41
End: 2021-09-30
Payer: COMMERCIAL

## 2021-10-01 ENCOUNTER — HOSPITAL ENCOUNTER (OUTPATIENT)
Facility: AMBULATORY SURGERY CENTER | Age: 41
End: 2021-10-01
Attending: SURGERY
Payer: COMMERCIAL

## 2021-10-01 ENCOUNTER — ANESTHESIA (OUTPATIENT)
Dept: SURGERY | Facility: AMBULATORY SURGERY CENTER | Age: 41
End: 2021-10-01
Payer: COMMERCIAL

## 2021-10-01 VITALS
TEMPERATURE: 97.7 F | SYSTOLIC BLOOD PRESSURE: 120 MMHG | HEIGHT: 72 IN | OXYGEN SATURATION: 97 % | BODY MASS INDEX: 25.06 KG/M2 | DIASTOLIC BLOOD PRESSURE: 70 MMHG | WEIGHT: 185 LBS | RESPIRATION RATE: 16 BRPM | HEART RATE: 72 BPM

## 2021-10-01 DIAGNOSIS — A63.0 ANAL CONDYLOMA: ICD-10-CM

## 2021-10-01 PROCEDURE — 46910 DESTRUCTION ANAL LESION(S): CPT | Performed by: SURGERY

## 2021-10-01 PROCEDURE — 46910 DESTRUCTION ANAL LESION(S): CPT

## 2021-10-01 RX ORDER — ACETAMINOPHEN 325 MG/1
975 TABLET ORAL ONCE
Status: COMPLETED | OUTPATIENT
Start: 2021-10-01 | End: 2021-10-01

## 2021-10-01 RX ORDER — FENTANYL CITRATE 50 UG/ML
25 INJECTION, SOLUTION INTRAMUSCULAR; INTRAVENOUS
Status: DISCONTINUED | OUTPATIENT
Start: 2021-10-01 | End: 2021-10-02 | Stop reason: HOSPADM

## 2021-10-01 RX ORDER — OXYCODONE HYDROCHLORIDE 5 MG/1
5 TABLET ORAL EVERY 4 HOURS PRN
Status: DISCONTINUED | OUTPATIENT
Start: 2021-10-01 | End: 2021-10-02 | Stop reason: HOSPADM

## 2021-10-01 RX ORDER — DEXAMETHASONE SODIUM PHOSPHATE 4 MG/ML
INJECTION, SOLUTION INTRA-ARTICULAR; INTRALESIONAL; INTRAMUSCULAR; INTRAVENOUS; SOFT TISSUE PRN
Status: DISCONTINUED | OUTPATIENT
Start: 2021-10-01 | End: 2021-10-01

## 2021-10-01 RX ORDER — GLYCOPYRROLATE 0.2 MG/ML
INJECTION, SOLUTION INTRAMUSCULAR; INTRAVENOUS PRN
Status: DISCONTINUED | OUTPATIENT
Start: 2021-10-01 | End: 2021-10-01

## 2021-10-01 RX ORDER — ONDANSETRON 2 MG/ML
INJECTION INTRAMUSCULAR; INTRAVENOUS PRN
Status: DISCONTINUED | OUTPATIENT
Start: 2021-10-01 | End: 2021-10-01

## 2021-10-01 RX ORDER — HYDROMORPHONE HYDROCHLORIDE 1 MG/ML
0.2 INJECTION, SOLUTION INTRAMUSCULAR; INTRAVENOUS; SUBCUTANEOUS EVERY 5 MIN PRN
Status: DISCONTINUED | OUTPATIENT
Start: 2021-10-01 | End: 2021-10-01 | Stop reason: HOSPADM

## 2021-10-01 RX ORDER — SODIUM CHLORIDE, SODIUM LACTATE, POTASSIUM CHLORIDE, CALCIUM CHLORIDE 600; 310; 30; 20 MG/100ML; MG/100ML; MG/100ML; MG/100ML
INJECTION, SOLUTION INTRAVENOUS CONTINUOUS
Status: DISCONTINUED | OUTPATIENT
Start: 2021-10-01 | End: 2021-10-01 | Stop reason: HOSPADM

## 2021-10-01 RX ORDER — KETAMINE HYDROCHLORIDE 10 MG/ML
INJECTION INTRAMUSCULAR; INTRAVENOUS PRN
Status: DISCONTINUED | OUTPATIENT
Start: 2021-10-01 | End: 2021-10-01

## 2021-10-01 RX ORDER — LIDOCAINE 40 MG/G
CREAM TOPICAL
Status: DISCONTINUED | OUTPATIENT
Start: 2021-10-01 | End: 2021-10-01 | Stop reason: HOSPADM

## 2021-10-01 RX ORDER — PROPOFOL 10 MG/ML
INJECTION, EMULSION INTRAVENOUS CONTINUOUS PRN
Status: DISCONTINUED | OUTPATIENT
Start: 2021-10-01 | End: 2021-10-01

## 2021-10-01 RX ORDER — ONDANSETRON 4 MG/1
4 TABLET, ORALLY DISINTEGRATING ORAL
Status: DISCONTINUED | OUTPATIENT
Start: 2021-10-01 | End: 2021-10-02 | Stop reason: HOSPADM

## 2021-10-01 RX ORDER — GABAPENTIN 300 MG/1
300 CAPSULE ORAL
Status: COMPLETED | OUTPATIENT
Start: 2021-10-01 | End: 2021-10-01

## 2021-10-01 RX ORDER — FENTANYL CITRATE 50 UG/ML
25 INJECTION, SOLUTION INTRAMUSCULAR; INTRAVENOUS EVERY 5 MIN PRN
Status: DISCONTINUED | OUTPATIENT
Start: 2021-10-01 | End: 2021-10-01 | Stop reason: HOSPADM

## 2021-10-01 RX ORDER — ACETAMINOPHEN 325 MG/1
975 TABLET ORAL ONCE
Status: DISCONTINUED | OUTPATIENT
Start: 2021-10-01 | End: 2021-10-01 | Stop reason: HOSPADM

## 2021-10-01 RX ORDER — ONDANSETRON 2 MG/ML
4 INJECTION INTRAMUSCULAR; INTRAVENOUS EVERY 30 MIN PRN
Status: DISCONTINUED | OUTPATIENT
Start: 2021-10-01 | End: 2021-10-02 | Stop reason: HOSPADM

## 2021-10-01 RX ORDER — OXYCODONE HYDROCHLORIDE 5 MG/1
5 TABLET ORAL EVERY 8 HOURS PRN
Qty: 6 TABLET | Refills: 0 | Status: SHIPPED | OUTPATIENT
Start: 2021-10-01 | End: 2021-10-04

## 2021-10-01 RX ORDER — ONDANSETRON 4 MG/1
4 TABLET, ORALLY DISINTEGRATING ORAL EVERY 30 MIN PRN
Status: DISCONTINUED | OUTPATIENT
Start: 2021-10-01 | End: 2021-10-02 | Stop reason: HOSPADM

## 2021-10-01 RX ORDER — BUPIVACAINE HYDROCHLORIDE 5 MG/ML
INJECTION, SOLUTION PERINEURAL PRN
Status: DISCONTINUED | OUTPATIENT
Start: 2021-10-01 | End: 2021-10-01 | Stop reason: HOSPADM

## 2021-10-01 RX ORDER — LIDOCAINE HYDROCHLORIDE AND EPINEPHRINE 10; 10 MG/ML; UG/ML
INJECTION, SOLUTION INFILTRATION; PERINEURAL PRN
Status: DISCONTINUED | OUTPATIENT
Start: 2021-10-01 | End: 2021-10-01 | Stop reason: HOSPADM

## 2021-10-01 RX ORDER — ONDANSETRON 2 MG/ML
4 INJECTION INTRAMUSCULAR; INTRAVENOUS ONCE
Status: DISCONTINUED | OUTPATIENT
Start: 2021-10-01 | End: 2021-10-01 | Stop reason: HOSPADM

## 2021-10-01 RX ORDER — KETOROLAC TROMETHAMINE 30 MG/ML
INJECTION, SOLUTION INTRAMUSCULAR; INTRAVENOUS PRN
Status: DISCONTINUED | OUTPATIENT
Start: 2021-10-01 | End: 2021-10-01

## 2021-10-01 RX ORDER — LIDOCAINE HYDROCHLORIDE 20 MG/ML
INJECTION, SOLUTION INFILTRATION; PERINEURAL PRN
Status: DISCONTINUED | OUTPATIENT
Start: 2021-10-01 | End: 2021-10-01

## 2021-10-01 RX ORDER — MEPERIDINE HYDROCHLORIDE 25 MG/ML
12.5 INJECTION INTRAMUSCULAR; INTRAVENOUS; SUBCUTANEOUS
Status: DISCONTINUED | OUTPATIENT
Start: 2021-10-01 | End: 2021-10-02 | Stop reason: HOSPADM

## 2021-10-01 RX ORDER — SODIUM CHLORIDE, SODIUM LACTATE, POTASSIUM CHLORIDE, CALCIUM CHLORIDE 600; 310; 30; 20 MG/100ML; MG/100ML; MG/100ML; MG/100ML
INJECTION, SOLUTION INTRAVENOUS CONTINUOUS
Status: DISCONTINUED | OUTPATIENT
Start: 2021-10-01 | End: 2021-10-02 | Stop reason: HOSPADM

## 2021-10-01 RX ADMIN — GABAPENTIN 300 MG: 300 CAPSULE ORAL at 07:04

## 2021-10-01 RX ADMIN — DEXAMETHASONE SODIUM PHOSPHATE 4 MG: 4 INJECTION, SOLUTION INTRA-ARTICULAR; INTRALESIONAL; INTRAMUSCULAR; INTRAVENOUS; SOFT TISSUE at 07:32

## 2021-10-01 RX ADMIN — ACETAMINOPHEN 975 MG: 325 TABLET ORAL at 07:04

## 2021-10-01 RX ADMIN — LIDOCAINE HYDROCHLORIDE 100 MG: 20 INJECTION, SOLUTION INFILTRATION; PERINEURAL at 07:28

## 2021-10-01 RX ADMIN — KETOROLAC TROMETHAMINE 30 MG: 30 INJECTION, SOLUTION INTRAMUSCULAR; INTRAVENOUS at 07:51

## 2021-10-01 RX ADMIN — SODIUM CHLORIDE, SODIUM LACTATE, POTASSIUM CHLORIDE, CALCIUM CHLORIDE: 600; 310; 30; 20 INJECTION, SOLUTION INTRAVENOUS at 07:08

## 2021-10-01 RX ADMIN — KETAMINE HYDROCHLORIDE 25 MG: 10 INJECTION INTRAMUSCULAR; INTRAVENOUS at 07:32

## 2021-10-01 RX ADMIN — GLYCOPYRROLATE 0.2 MG: 0.2 INJECTION, SOLUTION INTRAMUSCULAR; INTRAVENOUS at 07:22

## 2021-10-01 RX ADMIN — PROPOFOL 150 MCG/KG/MIN: 10 INJECTION, EMULSION INTRAVENOUS at 07:28

## 2021-10-01 RX ADMIN — ONDANSETRON 4 MG: 2 INJECTION INTRAMUSCULAR; INTRAVENOUS at 07:32

## 2021-10-01 ASSESSMENT — LIFESTYLE VARIABLES: TOBACCO_USE: 1

## 2021-10-01 ASSESSMENT — MIFFLIN-ST. JEOR: SCORE: 1777.15

## 2021-10-01 NOTE — ANESTHESIA POSTPROCEDURE EVALUATION
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    Anesthesia Type:  MAC    Note:  Disposition: Outpatient   Postop Pain Control: Uneventful            Sign Out: Well controlled pain   PONV: No   Neuro/Psych: Uneventful            Sign Out: Acceptable/Baseline neuro status   Airway/Respiratory: Uneventful            Sign Out: Acceptable/Baseline resp. status   CV/Hemodynamics: Uneventful            Sign Out: Acceptable CV status; No obvious hypovolemia; No obvious fluid overload   Other NRE: NONE   DID A NON-ROUTINE EVENT OCCUR? No           Last vitals:  Vitals Value Taken Time   /70 10/01/21 0814   Temp 36.5  C (97.7  F) 10/01/21 0814   Pulse     Resp 16 10/01/21 0814   SpO2 97 % 10/01/21 0814       Electronically Signed By: MARY YOO MD  October 1, 2021  10:06 AM

## 2021-10-01 NOTE — OP NOTE
"[facility] Colorectal Surgery Operative Report    PREOPERATIVE DIAGNOSIS:  1. Anal condyloma.  2. Impaired fasting glucose  3. Dyspnea on exertion    POSTOPERATIVE DIAGNOSIS:   1. Anal condyloma.  2. Impaired fasting glucose  3. Dyspnea on exertion    PROCEDURE:  1. Evaluation under anesthesia.  2. Fulguration of anal condyloma.    ANESTHESIA: MAC plus local anesthesia.    SURGEON:  Isra Millan M.D.    INDICATIONS FOR PROCEDURE  Yonis Mcnair is a 41 year old male who presented with symptomatic anal condyloma. I thoroughly discussed the risks, benefits, and alternatives of operative treatment with the patient and he/she agreed to proceed.    General risks related to anorectal surgery were reviewed with the patient. These include, but are not limited to urinary retention, abscess, infection, bleeding, chronic pain, anal stenosis, fistula, fissure, and fecal incontinence.     OPERATIVE PROCEDURE: After obtaining informed consent, the patient was brought to the operating room and placed in the prone jackknife position. Appropriate preoperative mechanical deep venous thrombosis prophylaxis was administered. MAC anesthesia was gently induced. Bilateral lower extremity pneumatic compression devices were applied and all pressure points were cushioned. The perianal area was then preped and draped in the standard sterile fashion. After a \"time-out\" was performed, a total of 30 mL of 1% lidocaine with epinephrine mixed with Bupivacaine 0.5% without epinephrine was injected as a pudendal block. Digital rectal exam and anoscopy were performed. All visible condyloma were fulgurated with monopolar electrocautery. Externally, it was focused primarily in the left lateral aspect of anal verge, and internally was generally scattered mild, small diease which was cauterized.The eschar was curetted. No specimens were sent. The distal rectum and anal canal were irrigated. Hemostasis was achieved. Instrument, sponge, and needle counts " were all correct as reported to me. Silvadene was applied, as well as a sterile dressing. The patient tolerated the procedure well.    COMPLICATIONS: none, immediately.    ESTIMATED BLOOD LOSS: <1 mL.    SPECIMEN(S): None.    DRAINS/TUBES: None.    OPERATIVE FINDINGS: mild mixed anal condyloma, predominantly at external left aspect of the anus, mild internal scattered disease    DISPOSITION: PACU.      Isra Millan MD  Division of Colon and Rectal Surgery  [facility]  [phone]      CC:  [facility] Surgery billing.  Agustina Nolasco NP.

## 2021-10-01 NOTE — DISCHARGE INSTRUCTIONS
Anorectal Surgery Instructions    What can I expect after anorectal surgery?  Most anorectal procedures are done as outpatient surgery, and you go home the same day as the procedure. A few surgical procedures will require that you stay in the hospital for about one to three days. No matter where the procedure is done or how long or short it takes, these recommendations will help you heal and feel more comfortable.    Medicines:  The anal area is very sensitive; you can expect to have some pain for up to 2-4 weeks after the procedure. Your doctor will give you a prescription for one or more pain medications.    Take ibuprofen 600 mg four times a day OR naprosyn 500 mg twice a day    Take this on a regular basis (not as needed) following your surgery.     The drugs are best taken with food.  Do not take if it causes stomach upset or if you have a history of ulcers or gastritis. You can stop the naprosyn (or ibuprofen) or reduce the dose when you are feeling better.    Take acetominaphen (Tylenol) 650-1000 mg four times a day.     Take this on a regular basis (not as needed) following surgery for pain control.     Take the lower dose if you are >65 years old or have liver disease. The maximum dose of acetominaphen is 4000 mg a day. You can stop the acetaminophen or reduce the dose when you are feeling better.    It is important to realize that many narcotic pain relievers (including vicodin, percocet, tylenol #3) also have acetaminophen, and excessive doses of acetaminophen can be dangerous, so do not take these in addition to acetominaphen.  You may take narcotics that don't contain acetominaphen such as oxycodone.      Take oxycodone AS NEEDED in addition to the acetominaphen and naprosyn.      Because narcotics have side effects (including constipation), you should reduce your use of these medications as tolerated as your pain improves.    *In general, the best strategy is to take (if you are able to tolerate it)  the tylenol and ibuprofen on a regular basis until your pain has largely gone away. You can take the narcotic pain medicine as needed in addition to the tylenol and ibuprofen. As your pain begins to lessen, you should cut back on your narcotic use while continuing to take your regular tylenol and ibuprofen doses.      Refilling prescriptions. If you need additional pain medication, please call the triage nurse at [phone] during normal business hours (8 a.m. to 4 p.m., Monday though Friday) or have your pharmacy fax a refill request to [phone]. If you call after hours or on the weekends, the doctor on call may not know you personally and may not renew narcotic pain medication by phone. Call your primary care provider for all other medication refills.    Perineal care:  Tub baths:    If possible, take a tub bath or shower immediately after each bowel movement.     Baths should be take at least 3 times daily for the first week to 10 days following your procedure. You should soak in the tub for 10 to 15 minutes each time with water as warm as you can tolerate. You may also use a microphone shower head (with an extension) to shower your back-side instead of a bath.    Even after you go back to work, it is a good idea to sit in the tub in the morning, after returning from work, and again in the evening before bedtime.    Bleeding/Infection:    You can expect to have some bleeding after bowel movements, but it should stop soon after you wipe.     Use a wet cloth or perianal pad (Tucks or Preparation H pads) to gently wipe the area after each bowel movement.    Do not rub the anal area or use a lot of pressure.    Using a spray bottle filled with warm water helps loosen any remaining stool. Blot gently with a soft dry cloth or tissue paper.    Infection around the anal opening is not very common. The anal area has excellent blood supply, which helps the area to heal. Bloody discharge after bowel movements is  normal and may last 2 to 4 weeks after your surgery. However, if you bleed between bowel movements and cannot get it to stop, call the triage nurse immediately [phone].    Bowel function:  Take a fiber supplement such as Metamucil, which is over the counter. It is important to drink six to eight glasses of water or juice everyday when using fiber products.    If you do not have a bowel movement after 1-2 days:    Take Milk of Magnesia-2 tablespoons.       If there are no results, repeat this or add over the counter Miralax.      If you still do not have results, contact the clinic.     If there are no results, repeat this. Stop taking Milk of Magnesia or other laxatives if you begin to have diarrhea.    * Constipation will cause you to strain when you have a bowel movement. The hard stool will be difficult to pass, will increase pain and bleeding, and will slow down healing. Try to avoid constipation and/or diarrhea as this can make the pain and bleeding worse.    * It is important to have regular bowel movements at least every other day and to keep your stool soft. A high fiber diet, including at least four servings of fruits or vegetables daily, will help to keep your bowel movements regular and soft.    Activity:  After your procedure, there are no restrictions on your activity     Except restrictions because being on narcotics and in pain, such as no heavy machine operating or driving.     You may walk, climb stairs, ride in a car, and sit as tolerated.     It is helpful to avoid sitting in one position for long periods (2 or more hours).    After some surgeries, you may be told not to perform any lifting (more than 10 pounds) for several weeks after surgery.    When to call:  When do I need to call the doctor or triage nurse?    If you experience any of the problems listed here, call our triage nurse during business hours ([phone]).     The nurse will help you with your problem or have the doctor call  you.     After hours and on weekends, please call the main hospital number ([phone]) and ask for the colon and rectal surgery person on call.     Call for:   ? Fever greater than 101 degrees   ? Chills   ? Foul-smelling drainage   ? Nausea and vomiting   ? Diarrhea - greater than 4 water stools in 24 hours   ? Constipation - no bowel movement after 3 days   ? Severe bleeding that does not stop soon after a bowel movement   ? Problems with the incision, including severe pain, swelling, or redness    [facility]  Home Care Following Anesthesia  For 24 hours after surgery:  1. Get plenty of rest.  A responsible adult must stay with you for at least 24 hours after you leave the surgery center.  2. Do not drive or use heavy equipment.  If you have weakness or tingling, don't drive or use heavy equipment until this feeling goes away.   3. Do not drink alcohol.   4. Avoid strenuous or risky activities.  Ask for help when climbing stairs.  5. You may feel lightheaded.  IF so, sit for a few minutes before standing.  Have someone help you get up.   6. If you have nausea (feel sick to your stomach): Drink only clear liquids such as apple juice, ginger ale, broth or 7-Up.  Rest may also help.  Be sure to drink enough fluids.  Move to a regular diet as you feel able.   7. You may have a slight fever.  Call the doctor if your fever is over 100 F (37.7 C) (taken under the tongue) or lasts longer than 24 hours.  8. You may have a dry mouth, a sore throat, muscle aches or trouble sleeping. These should go away after 24 hours.  9. Do not make important or legal decisions.   10. It is recommended to avoid smoking.               Tips for taking pain medications  To get the best pain relief possible, remember these points:    Take pain medications as directed, before pain becomes severe.    Pain medication can upset your stomach: taking it with food may help.    Constipation is a common side  effect of pain medication. Drink plenty of  fluids.    Eat foods high in fiber. Take a stool softener if recommended by your doctor or pharmacist.    Do not drink alcohol, drive or operate machinery while taking pain medications.    Ask about other ways to control pain, such as with heat, ice or relaxation.    Tylenol/Acetaminophen Consumption  To help encourage the safe use of acetaminophen, the makers of TYLENOL  have lowered the maximum daily dose for single-ingredient Extra Strength TYLENOL  (acetaminophen) products sold in the [place] from 8 pills per day (4,000 mg) to 6 pills per day (3,000 mg). The dosing interval has also changed from 2 pills every 4-6 hours to 2 pills every 6 hours.    If you feel your pain relief is insufficient, you may take Tylenol/Acetaminophen in addition to your narcotic pain medication.     Be careful not to exceed 3,000 mg of Tylenol/Acetaminophen in a 24 hour period from all sources.    If you are taking extra strength Tylenol/acetaminophen (500 mg), the maximum dose is 6 tablets in 24 hours.    If you are taking regular strength acetaminophen (325 mg), the maximum dose is 9 tablets in 24 hours.    Call a doctor for any of the followin. Signs of infection (fever, growing tenderness at the surgery site, a large amount of drainage or bleeding, severe pain, foul-smelling drainage, redness, swelling).  2. It has been over 8 to 10 hours since surgery and you are still not able to urinate (pass water).  3. Headache for over 24 hours.  4. Signs of Covid-19 infection (temperature over 100 degrees, shortness of breath, cough, loss of taste/smell, generalized body aches, persistent headache, chills, sore throat, nausea/vomiting/diarrhea)  Your doctor is:  Dr. Isra Millan, Colon Rectal: [phone]                  Or dial [phone] and ask for the resident on call for:  Colon Rectal  For emergency care, call the:  [facility]:  [phone] (TTY for hearing  impaired: [phone])

## 2021-10-01 NOTE — ANESTHESIA PREPROCEDURE EVALUATION
Anesthesia Pre-Procedure Evaluation    Patient: Yonis Mcnair   MRN: [id number] : 1980        Preoperative Diagnosis: Anal condyloma [A63.0]   Procedure : Procedure(s):  EXAM UNDER ANESTHESIA,  excision and fulguration of anal condyloma     Past Medical History:   Diagnosis Date     Dyspnea on exertion      Impaired fasting glucose 2017     Pain in right shoulder     SLAP TEAR     PONV (postoperative nausea and vomiting)       Past Surgical History:   Procedure Laterality Date     ARTHROSCOPY HIP, OSTEOPLASTY FEMUR PROXIMAL, COMBINED  2013    Procedure: COMBINED ARTHROSCOPY HIP, OSTEOPLASTY FEMUR PROXIMAL;  Left Hip Arthroscopy, Cam Correction, Labral Repair ,;  Surgeon: Stanley Edwards MD;  [facility]     ARTHROSCOPY SHOULDER SUPERIOR LABRUM ANTERIOR TO POSTERIOR REPAIR  2011    Procedure:ARTHROSCOPY SHOULDER SUPERIOR LABRUM ANTERIOR TO POSTERIOR REPAIR; Right Shoulder Arthroscopy,   Superior Labral Anteroposterior Repair, Distal Clavicle Excision              ; Surgeon:JAI HERRERA; [facility]     C SHOULDER SURG PROC UNLISTED       HERNIA REPAIR, UMBILICAL  1979     LASIK Bilateral 2011      Allergies   Allergen Reactions     Amoxicillin Rash      Social History     Tobacco Use     Smoking status: Former Smoker     Types: Cigarettes     Smokeless tobacco: Former User   Substance Use Topics     Alcohol use: Yes     Alcohol/week: 10.0 standard drinks     Comment: 5-10DRINKS PER WEEK       Wt Readings from Last 1 Encounters:   10/01/21 83.9 kg (185 lb)        Anesthesia Evaluation            ROS/MED HX  ENT/Pulmonary:     (+) tobacco use, Past use,     Neurologic:       Cardiovascular:     (+) -----Previous cardiac testing   Echo: Date: Results:    Stress Test: Date: Results:    ECG Reviewed: Date: 19 Results:  SR, RSR'  Cath: Date: Results:      METS/Exercise Tolerance:     Hematologic:       Musculoskeletal:       GI/Hepatic:       Renal/Genitourinary:        Endo:       Psychiatric/Substance Use:       Infectious Disease:       Malignancy:       Other:            Physical Exam    Airway  airway exam normal      Mallampati: I   TM distance: > 3 FB   Neck ROM: full   Mouth opening: > 3 cm    Respiratory Devices and Support         Dental  no notable dental history         Cardiovascular   cardiovascular exam normal          Pulmonary   pulmonary exam normal                OUTSIDE LABS:  CBC:   Lab Results   Component Value Date    WBC 4.9 09/22/2021    WBC 8.0 06/26/2019    HGB 16.3 09/22/2021    HGB 16.6 06/26/2019    HCT 45.7 09/22/2021    HCT 46.3 06/26/2019     09/22/2021     06/26/2019     BMP:   Lab Results   Component Value Date     09/22/2021     06/26/2019    POTASSIUM 4.8 09/22/2021    POTASSIUM 4.1 06/26/2019    CHLORIDE 105 09/22/2021    CHLORIDE 103 06/26/2019    CO2 30 09/22/2021    CO2 26 06/26/2019    BUN 14 09/22/2021    BUN 23 06/26/2019    CR 0.99 09/22/2021    CR 1.01 06/26/2019     (H) 09/22/2021    GLC 97 06/26/2019     COAGS: No results found for: PTT, INR, FIBR  POC: No results found for: BGM, HCG, HCGS  HEPATIC:   Lab Results   Component Value Date    ALBUMIN 4.2 09/22/2021    PROTTOTAL 7.4 09/22/2021    ALT 60 09/22/2021    AST 23 09/22/2021    ALKPHOS 83 09/22/2021    BILITOTAL 0.9 09/22/2021     OTHER:   Lab Results   Component Value Date    A1C 5.4 09/22/2021    ENA 9.3 09/22/2021       Anesthesia Plan    ASA Status:  1   NPO Status:  NPO Appropriate    Anesthesia Type: MAC.     - Reason for MAC: straight local not clinically adequate   Induction: Intravenous.   Maintenance: TIVA.        Consents    Anesthesia Plan(s) and associated risks, benefits, and realistic alternatives discussed. Questions answered and patient/representative(s) expressed understanding.     - Discussed with:  Patient         Postoperative Care    Pain management: IV analgesics, Multi-modal analgesia.   PONV prophylaxis: Background  Propofol Infusion     Comments:                MARY YOO MD

## 2021-10-20 ENCOUNTER — IMMUNIZATION (OUTPATIENT)
Dept: NURSING | Facility: CLINIC | Age: 41
End: 2021-10-20
Payer: COMMERCIAL

## 2021-10-20 PROCEDURE — 0004A PR COVID VAC PFIZER DIL RECON 30 MCG/0.3 ML IM: CPT

## 2021-10-20 PROCEDURE — 91300 PR COVID VAC PFIZER DIL RECON 30 MCG/0.3 ML IM: CPT

## 2022-01-15 ENCOUNTER — HEALTH MAINTENANCE LETTER (OUTPATIENT)
Age: 42
End: 2022-01-15

## 2022-04-27 ENCOUNTER — LAB (OUTPATIENT)
Dept: URGENT CARE | Facility: URGENT CARE | Age: 42
End: 2022-04-27
Attending: FAMILY MEDICINE
Payer: COMMERCIAL

## 2022-04-27 DIAGNOSIS — Z20.822 SUSPECTED 2019 NOVEL CORONAVIRUS INFECTION: ICD-10-CM

## 2022-04-27 LAB — SARS-COV-2 RNA RESP QL NAA+PROBE: NEGATIVE

## 2022-04-27 PROCEDURE — U0005 INFEC AGEN DETEC AMPLI PROBE: HCPCS

## 2022-04-27 PROCEDURE — U0003 INFECTIOUS AGENT DETECTION BY NUCLEIC ACID (DNA OR RNA); SEVERE ACUTE RESPIRATORY SYNDROME CORONAVIRUS 2 (SARS-COV-2) (CORONAVIRUS DISEASE [COVID-19]), AMPLIFIED PROBE TECHNIQUE, MAKING USE OF HIGH THROUGHPUT TECHNOLOGIES AS DESCRIBED BY CMS-2020-01-R: HCPCS

## 2022-05-01 ENCOUNTER — E-VISIT (OUTPATIENT)
Dept: URGENT CARE | Facility: CLINIC | Age: 42
End: 2022-05-01
Payer: COMMERCIAL

## 2022-05-01 DIAGNOSIS — J01.90 ACUTE SINUSITIS WITH SYMPTOMS > 10 DAYS: Primary | ICD-10-CM

## 2022-05-01 PROCEDURE — 99421 OL DIG E/M SVC 5-10 MIN: CPT | Performed by: PHYSICIAN ASSISTANT

## 2022-05-01 RX ORDER — DOXYCYCLINE HYCLATE 100 MG
100 TABLET ORAL 2 TIMES DAILY
Qty: 14 TABLET | Refills: 0 | Status: SHIPPED | OUTPATIENT
Start: 2022-05-01 | End: 2022-05-08

## 2022-05-01 NOTE — PATIENT INSTRUCTIONS
Dear Yonis Mcnair    After reviewing your responses, I've been able to diagnose you with?a sinus infection caused by bacteria.?     Based on your responses and diagnosis, I have prescribed Doxycycline to treat your symptoms. I have sent this to your pharmacy.?     It is also important to stay well hydrated, get lots of rest and take over-the-counter decongestants,?tylenol?or ibuprofen if you?are able to?take those medications per your primary care provider to help relieve discomfort.?     It is important that you take?all of?your prescribed medication even if your symptoms are improving after a few doses.? Taking?all of?your medicine helps prevent the symptoms from returning.?     If your symptoms worsen, you develop severe headache, vomiting, high fever (>102), or are not improving in 7 days, please contact your primary care provider for an appointment or visit any of our convenient Walk-in Care or Urgent Care Centers to be seen which can be found on our website?here.?     Thanks again for choosing?us?as your health care partner,?   ?  Alannah Wilkerson PA-C?

## 2022-07-13 ENCOUNTER — OFFICE VISIT (OUTPATIENT)
Dept: SURGERY | Facility: CLINIC | Age: 42
End: 2022-07-13
Payer: COMMERCIAL

## 2022-07-13 VITALS
OXYGEN SATURATION: 99 % | HEART RATE: 76 BPM | HEIGHT: 72 IN | SYSTOLIC BLOOD PRESSURE: 126 MMHG | WEIGHT: 177 LBS | DIASTOLIC BLOOD PRESSURE: 80 MMHG | BODY MASS INDEX: 23.98 KG/M2

## 2022-07-13 DIAGNOSIS — A63.0 ANAL CONDYLOMATA: Primary | ICD-10-CM

## 2022-07-13 DIAGNOSIS — Z23 NEED FOR HPV VACCINE: Primary | ICD-10-CM

## 2022-07-13 PROCEDURE — 99207 PR NON-BILLABLE SERV PER CHARTING: CPT | Performed by: NURSE PRACTITIONER

## 2022-07-13 PROCEDURE — 99213 OFFICE O/P EST LOW 20 MIN: CPT | Mod: 25 | Performed by: NURSE PRACTITIONER

## 2022-07-13 PROCEDURE — 90471 IMMUNIZATION ADMIN: CPT | Performed by: NURSE PRACTITIONER

## 2022-07-13 PROCEDURE — 90651 9VHPV VACCINE 2/3 DOSE IM: CPT | Performed by: NURSE PRACTITIONER

## 2022-07-13 ASSESSMENT — PAIN SCALES - GENERAL: PAINLEVEL: NO PAIN (0)

## 2022-07-13 NOTE — LETTER
2022       RE: Yonis Mcnair  [address]  [facility] [address]     Dear Colleague,    Thank you for referring your patient, Yonis Mcnair, to the [facility] COLON AND RECTAL SURGERY CLINIC [place] at [facility]. Please see a copy of my visit note below.    Colon and Rectal Surgery Follow-Up Clinic Note    RE: Yonis Mcnair  : 1980  MALIK: 2022    Yonis Mcnair is a very pleasant 41 year old male here for follow-up of anal condyloma. He is status post evaluation under anesthesia with fulguration of anal condyloma with Dr. Millan 10/1/21.     Interval history: Yonis has been doing well. He returns today for a wart check. He is a former smoker.    Physical Examination: Exam was chaperoned by Cyndi Ramirez PA-C   /80 (BP Location: Left arm, Patient Position: Sitting, Cuff Size: Adult Regular)   Pulse 76   Ht 6'   Wt 177 lb   SpO2 99%   BMI 24.01 kg/m    General: alert, oriented, in no acute distress, sitting comfortably  HEENT: moist mucous membranes    Perianal external examination:  Perianal skin: Intact with no excoriation or lichenification.  Lesions: No evidence of an external lesion, nodularity, or induration in the perianal region.  Eversion of buttocks: There was not evidence of an anal fissure. Details: N/A.  Skin tags or external hemorrhoids: None.    Digital rectal examination: Was performed.   Sphincter tone: Good.  Palpable lesions: Yes - Location: small palpable anterior lesion.  Prostate: Normal.  Other: None.    Anoscopy: Was performed.   Hemorrhoids: No significant internal hemorrhoids.  Lesions: Yes: Small scattered verruciform lesions to the anterior, left lateral, and posterior regions.    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 I discussed that these would be best removed in the OR. Plan for examination under anesthesia with fulguration of anal condylomata. We also discussed  starting the HPV vaccine and he agreed to starting the series. He will return in 2 months for his second dose and 6 months after that for the third dose. Discussed the recurrent nature of warts and would like to see him back about every 6 months for recheck. Patient's questions were answered to his stated satisfaction and he is in agreement with this plan.      Medical history:  Past Medical History:   Diagnosis Date     Dyspnea on exertion      Impaired fasting glucose 5/17/2017     Pain in right shoulder     SLAP TEAR     PONV (postoperative nausea and vomiting)        Surgical history:  Past Surgical History:   Procedure Laterality Date     ARTHROSCOPY HIP, OSTEOPLASTY FEMUR PROXIMAL, COMBINED  1/14/2013    Procedure: COMBINED ARTHROSCOPY HIP, OSTEOPLASTY FEMUR PROXIMAL;  Left Hip Arthroscopy, Cam Correction, Labral Repair ,;  Surgeon: Stanley Edwards MD;  [facility]     ARTHROSCOPY SHOULDER SUPERIOR LABRUM ANTERIOR TO POSTERIOR REPAIR  12/30/2011    Procedure:ARTHROSCOPY SHOULDER SUPERIOR LABRUM ANTERIOR TO POSTERIOR REPAIR; Right Shoulder Arthroscopy,   Superior Labral Anteroposterior Repair, Distal Clavicle Excision              ; Surgeon:JAI HERRERA; [facility]     EXAM UNDER ANESTHESIA ANUS N/A 10/1/2021    Procedure: EXAM UNDER ANESTHESIA,;  Surgeon: Isra Millan MD;  Location: [facility] OR     FULGURATE CONDYLOMA RECTUM N/A 10/1/2021    Procedure: excision and fulguration of anal condyloma;  Surgeon: Isra Millan MD;  Location: [facility] OR     HERNIA REPAIR, UMBILICAL  1979     LASIK Bilateral March 2011     [facility] SHOULDER SURG PROC UNLISTED         Problem list:  Patient Active Problem List    Diagnosis Date Noted     Anal condyloma 08/04/2021     Priority: Medium     Added automatically from request for surgery [id number]       Impaired fasting glucose 05/17/2017     Priority: Medium     S/P LASIK surgery 04/27/2016     Priority: Medium     Hip pain, chronic, left 10/07/2015      Priority: Medium     Alopecia 03/11/2015     Priority: Medium     Problem list name updated by automated process. Provider to review         Medications:  Current Outpatient Medications   Medication Sig Dispense Refill     finasteride (PROSCAR) 5 MG tablet TAKE 1/4 TABLET BY MOUTH DAILY 23 tablet 4     Fish Oil-Cholecalciferol (FISH OIL + D3 PO)        Multiple Vitamins-Minerals (MULTIVITAMIN ADULT PO) Take by mouth daily         Allergies:  Allergies   Allergen Reactions     Amoxicillin Rash       Family history:  Family History   Problem Relation Age of Onset     Lipids Father      Allergies Mother      C.A.D. Maternal Uncle      Musculoskeletal Disorder Paternal Grandfather      Heart Disease Paternal Grandfather      Asthma Maternal Grandfather      Thyroid Disease Paternal Grandmother      Diabetes Paternal Grandmother      Depression Paternal Grandmother      Breast Cancer Maternal Grandmother      Cancer Maternal Grandmother      Hypertension No family hx of      Cerebrovascular Disease No family hx of      Glaucoma No family hx of      Macular Degeneration No family hx of        Social history:  Social History     Tobacco Use     Smoking status: Former Smoker     Types: Cigarettes     Smokeless tobacco: Former User   Substance Use Topics     Alcohol use: Yes     Alcohol/week: 10.0 standard drinks     Comment: 5-10DRINKS PER WEEK      Marital status: .  Occupation: physical therapist.    Nursing Notes:   Sharri Grande  7/13/2022 11:02 AM  Signed  Chief Complaint   Patient presents with     Follow Up     Follow up from procedure last October       Vitals:    07/13/22 1100   BP: 126/80   BP Location: Left arm   Patient Position: Sitting   Cuff Size: Adult Regular   Pulse: 76   SpO2: 99%   Weight: 177 lb   Height: 6'       Body mass index is 24.01 kg/m .    Sharri Grande CMA      5 minutes spent on the date of encounter (excluding time performing procedures) performing chart review, history and exam,  documentation and further activities as noted above with an additional 2 minutes for anoscopy.       JAMAL VillanuevaC  Colon and Rectal Surgery  [facility]

## 2022-07-13 NOTE — NURSING NOTE
Chief Complaint   Patient presents with     Follow Up     Follow up from procedure last October       Vitals:    07/13/22 1100   BP: 126/80   BP Location: Left arm   Patient Position: Sitting   Cuff Size: Adult Regular   Pulse: 76   SpO2: 99%   Weight: 177 lb   Height: 6'       Body mass index is 24.01 kg/m .    Sharri Grande CMA

## 2022-07-19 ENCOUNTER — TELEPHONE (OUTPATIENT)
Dept: SURGERY | Facility: CLINIC | Age: 42
End: 2022-07-19

## 2022-07-20 NOTE — TELEPHONE ENCOUNTER
FUTURE VISIT INFORMATION      SURGERY INFORMATION:    Date: 22    [facility]    Surgeon:  Tahir Pacheco MD    Anesthesia Type:  MAC    Procedure: EXAM UNDER ANESTHESIA, ANUS, fulguration of anal condylomataFULGURATION, CONDYLOMA, RECTUM    RECORDS REQUESTED FROM:       Primary Care Provider: [facility]    Most recent EKG+ Tracin19

## 2022-09-01 ENCOUNTER — TELEPHONE (OUTPATIENT)
Dept: SURGERY | Facility: CLINIC | Age: 42
End: 2022-09-01

## 2022-09-01 NOTE — TELEPHONE ENCOUNTER
LVM with pt to tell him that we are able to administer his flu shot at the same time of his HPV #2 shot on 9/14/2022.     Sharri Grande, CMA

## 2022-09-01 NOTE — TELEPHONE ENCOUNTER
[facility] Call Center    Phone Message    May a detailed message be left on voicemail: yes     Reason for Call: Other: Yonis is calling in asking for a call back. He states that he is scheduled for his 2nd HPV shot on 9/14 with a surgery nurse, and wanted to speak with someone about whether he would be able to get his Flu shot at the same time. Please call back as soon as possible to discuss.     Action Taken: Message routed to:  [facility] ([facility] Gen Surg    Travel Screening: Not Applicable

## 2022-09-06 ENCOUNTER — MYC MEDICAL ADVICE (OUTPATIENT)
Dept: SURGERY | Facility: CLINIC | Age: 42
End: 2022-09-06

## 2022-09-12 ENCOUNTER — HOSPITAL ENCOUNTER (OUTPATIENT)
Facility: AMBULATORY SURGERY CENTER | Age: 42
End: 2022-09-12
Attending: COLON & RECTAL SURGERY
Payer: COMMERCIAL

## 2022-09-14 ENCOUNTER — ALLIED HEALTH/NURSE VISIT (OUTPATIENT)
Dept: SURGERY | Facility: CLINIC | Age: 42
End: 2022-09-14
Payer: COMMERCIAL

## 2022-09-14 ENCOUNTER — OFFICE VISIT (OUTPATIENT)
Dept: SURGERY | Facility: CLINIC | Age: 42
End: 2022-09-14
Payer: COMMERCIAL

## 2022-09-14 ENCOUNTER — PRE VISIT (OUTPATIENT)
Dept: SURGERY | Facility: CLINIC | Age: 42
End: 2022-09-14

## 2022-09-14 ENCOUNTER — ANESTHESIA EVENT (OUTPATIENT)
Dept: SURGERY | Facility: CLINIC | Age: 42
End: 2022-09-14

## 2022-09-14 VITALS
RESPIRATION RATE: 16 BRPM | DIASTOLIC BLOOD PRESSURE: 84 MMHG | OXYGEN SATURATION: 98 % | SYSTOLIC BLOOD PRESSURE: 127 MMHG | WEIGHT: 178.3 LBS | HEIGHT: 72 IN | BODY MASS INDEX: 24.15 KG/M2 | TEMPERATURE: 98.5 F | HEART RATE: 79 BPM

## 2022-09-14 DIAGNOSIS — Z01.818 PREOP EXAMINATION: Primary | ICD-10-CM

## 2022-09-14 DIAGNOSIS — Z23 NEED FOR IMMUNIZATION AGAINST INFLUENZA: Primary | ICD-10-CM

## 2022-09-14 DIAGNOSIS — A63.0 ANAL CONDYLOMATA: ICD-10-CM

## 2022-09-14 DIAGNOSIS — Z23 NEED FOR VACCINATION AGAINST HUMAN PAPILLOMAVIRUS: ICD-10-CM

## 2022-09-14 PROCEDURE — 90471 IMMUNIZATION ADMIN: CPT

## 2022-09-14 PROCEDURE — 99215 OFFICE O/P EST HI 40 MIN: CPT | Performed by: CLINICAL NURSE SPECIALIST

## 2022-09-14 PROCEDURE — 90472 IMMUNIZATION ADMIN EACH ADD: CPT

## 2022-09-14 PROCEDURE — 90651 9VHPV VACCINE 2/3 DOSE IM: CPT

## 2022-09-14 PROCEDURE — 90686 IIV4 VACC NO PRSV 0.5 ML IM: CPT

## 2022-09-14 ASSESSMENT — LIFESTYLE VARIABLES: TOBACCO_USE: 1

## 2022-09-14 ASSESSMENT — PAIN SCALES - GENERAL: PAINLEVEL: NO PAIN (0)

## 2022-09-14 ASSESSMENT — ENCOUNTER SYMPTOMS: SEIZURES: 0

## 2022-09-14 NOTE — PATIENT INSTRUCTIONS
Name:  Yonis Mcnair   MRN:  [id number]   :  1980   Today's Date:  2022       Arriving for surgery: Follow instructions from Dr. Pacheco's clinic.     -  No Alcohol or Marijuana for at least 24 hours before surgery.     Which medicines can I take?    Hold Aspirin for 7 days before surgery.   Hold Multivitamins for 7 days before surgery.  Hold Supplements for 7 days before surgery.  Hold Ibuprofen (Advil, Motrin) for 1 day before surgery--unless otherwise directed by surgeon.  Hold Naproxen (Aleve) for 4 days before surgery.    -  DO NOT take these medications the day of surgery:    Finasteride(Proscar)

## 2022-09-14 NOTE — H&P
Pre-Operative H & P     CC:  Preoperative exam to assess for increased cardiopulmonary risk while undergoing surgery and anesthesia.    Date of Encounter: 9/14/2022  Primary Care Physician:  Leo Sutton     Reason for visit:   Encounter Diagnoses   Name Primary?     Preop examination Yes     Anal condylomata        HPI  Yonis Mcnair is a 42 year old male who presents for pre-operative H & P in preparation for  Procedure Information     Date/Time: 9/30/22     Procedure:EXAM UNDER ANESTHESIA, ANUS, fulguration of anal condylomata     Anesthesia type: MAC    Pre-op diagnosis: Anal condylomata    Location: [facility] [place]    Providers: Dr. Pacheco          History is obtained from the patient and chart review    Patient with history of anal condyloma followed by Colon and Rectal Surgery. He is s/p evaluation under anesthesia with fulguration of anal condyloma with Dr. Millan 10/1/21. He recently returned in follow up with exam revealing anal condylomata. He was counseled for above procedures.    Hx of abnormal bleeding or anti-platelet use: Denies.       Past Medical History  Past Medical History:   Diagnosis Date     Anal condyloma      Dyspnea on exertion      Impaired fasting glucose 05/17/2017     Pain in right shoulder     SLAP TEAR     PONV (postoperative nausea and vomiting)        Past Surgical History  Past Surgical History:   Procedure Laterality Date     ARTHROSCOPY HIP, OSTEOPLASTY FEMUR PROXIMAL, COMBINED  1/14/2013    Procedure: COMBINED ARTHROSCOPY HIP, OSTEOPLASTY FEMUR PROXIMAL;  Left Hip Arthroscopy, Cam Correction, Labral Repair ,;  Surgeon: Stanley Edwards MD;  [facility]: [facility]     ARTHROSCOPY SHOULDER SUPERIOR LABRUM ANTERIOR TO POSTERIOR REPAIR  12/30/2011    Procedure:ARTHROSCOPY SHOULDER SUPERIOR LABRUM ANTERIOR TO POSTERIOR REPAIR; Right Shoulder Arthroscopy,   Superior Labral Anteroposterior Repair, Distal Clavicle Excision              ; Surgeon:JAI HERRERA  LENNOX; Location:[facility]     EXAM UNDER ANESTHESIA ANUS N/A 10/1/2021    Procedure: EXAM UNDER ANESTHESIA,;  Surgeon: Isra Millan MD;  Location: [facility] OR     FULGURATE CONDYLOMA RECTUM N/A 10/1/2021    Procedure: excision and fulguration of anal condyloma;  Surgeon: Isra Millan MD;  Location: [facility] OR     HERNIA REPAIR, UMBILICAL  1979     LASIK Bilateral March 2011     ZZC SHOULDER SURG PROC UNLISTED         Prior to Admission Medications  Current Outpatient Medications   Medication Sig Dispense Refill     finasteride (PROSCAR) 5 MG tablet TAKE 1/4 TABLET BY MOUTH DAILY (Patient taking differently: Take by mouth every morning TAKE 1/4 TABLET BY MOUTH DAILY) 23 tablet 4     Fish Oil-Cholecalciferol (FISH OIL + D3 PO) Take by mouth every morning       Multiple Vitamins-Minerals (MULTIVITAMIN ADULT PO) Take by mouth every morning         Allergies  Allergies   Allergen Reactions     Amoxicillin Rash       Social History  Social History     Socioeconomic History     Marital status:      Spouse name: Not on file     Number of children: 0     Years of education: 16     Highest education level: Not on file   Occupational History     Occupation: Physical Therapist     Employer: [employer]   Tobacco Use     Smoking status: Former Smoker     Types: Cigarettes     Smokeless tobacco: Former User     Tobacco comment: social smoker   Substance and Sexual Activity     Alcohol use: Yes     Comment: 5-10 DRINKS PER WEEK     Drug use: Yes     Types: Marijuana     Comment: occ     Sexual activity: Yes     Partners: Male     Birth control/protection: Condom   Other Topics Concern      Service No     Blood Transfusions No     Caffeine Concern No     Occupational Exposure No     Hobby Hazards No     Sleep Concern No     Stress Concern No     Weight Concern No     Special Diet No     Back Care No     Exercise Yes     Bike Helmet Yes     Seat Belt Yes     Self-Exams No     Parent/sibling w/ CABG,  MI or angioplasty before 65F 55M? Not Asked   Social History Narrative    ** Merged History Encounter **          Social Determinants of Health     Financial Resource Strain: Not on file   Food Insecurity: Not on file   Transportation Needs: Not on file   Physical Activity: Not on file   Stress: Not on file   Social Connections: Not on file   Intimate Partner Violence: Not on file   Housing Stability: Not on file       Family History  Family History   Problem Relation Age of Onset     Lipids Father      Allergies Mother      C.A.D. Maternal Uncle      Musculoskeletal Disorder Paternal Grandfather      Heart Disease Paternal Grandfather      Asthma Maternal Grandfather      Thyroid Disease Paternal Grandmother      Diabetes Paternal Grandmother      Depression Paternal Grandmother      Breast Cancer Maternal Grandmother      Cancer Maternal Grandmother      Hypertension No family hx of      Cerebrovascular Disease No family hx of      Glaucoma No family hx of      Macular Degeneration No family hx of        Review of Systems  The complete review of systems is negative other than noted in the HPI or here.   Anesthesia Evaluation   Pt has had prior anesthetic. Type: General and MAC.    History of anesthetic complications  - PONV.  PONV with GA.    ROS/MED HX  ENT/Pulmonary:     (+) ELEANOR risk factors, tobacco use, Past use,  (-) recent URI   Neurologic:  - neg neurologic ROS  (-) no seizures and no CVA   Cardiovascular: Comment: Reports occasional fast HR noted after drinking more than 1 alcoholic beverage    (+) -----Previous cardiac testing   Echo: Date: Results:    Stress Test: Date: Results:    ECG Reviewed: Date: 2019 Results:  SR, anterolateral ST elevation, repolarization variant  Cath: Date: Results:   (-) taking anticoagulants/antiplatelets and ALVARADO   METS/Exercise Tolerance: >4 METS    Hematologic:  - neg hematologic  ROS     Musculoskeletal:  - neg musculoskeletal ROS     GI/Hepatic:    (-) GERD    Renal/Genitourinary:  - neg Renal ROS     Endo:  - neg endo ROS     Psychiatric/Substance Use:  - neg psychiatric ROS   (+) Recreational drug usage: Cannabis.    Infectious Disease:  - neg infectious disease ROS     Malignancy:  - neg malignancy ROS     Other:  - neg other ROS          /84 (BP Location: Right arm, Patient Position: Sitting, Cuff Size: Adult Regular)   Pulse 79   Temp 98.5  F (36.9  C) (Oral)   Resp 16   Ht 1.829 m (6')   Wt 80.9 kg (178 lb 4.8 oz)   SpO2 98%   BMI 24.18 kg/m      Physical Exam   Constitutional: Awake, alert, cooperative, no apparent distress, and appears stated age.  Eyes: Pupils equal, round and reactive to light, extra ocular muscles intact, sclera clear, conjunctiva normal.  HENT: Normocephalic, oral pharynx with moist mucus membranes, good dentition. No goiter appreciated.   Respiratory: Clear to auscultation bilaterally, no crackles or wheezing. No cough or obvious dyspnea.  Cardiovascular: Regular rate and rhythm, normal S1 and S2, and no murmur noted. Carotids +2, no bruits. No edema. Palpable pulses to radial  DP and PT arteries.   GI: Normal bowel sounds, soft, non-distended, non-tender, no masses palpated, no hepatosplenomegaly.   Lymph/Hematologic: No cervical lymphadenopathy and no supraclavicular lymphadenopathy.  Genitourinary: Deferred.   Skin: Warm and dry.  Musculoskeletal: Full ROM of neck. There is no redness, warmth, or swelling of the joints. Gross motor strength is normal.    Neurologic: Awake, alert, oriented to name, place and time. Cranial nerves II-XII are grossly intact. Gait is normal.   Neuropsychiatric: Calm, cooperative. Normal affect.     Prior Labs/Diagnostic Studies   All labs and imaging personally reviewed   Lab Results   Component Value Date    WBC 4.9 09/22/2021    WBC 8.0 06/26/2019     Lab Results   Component Value Date    RBC 5.22 09/22/2021    RBC 5.25 06/26/2019     Lab Results   Component Value Date    HGB 16.3 09/22/2021     HGB 16.6 06/26/2019     Lab Results   Component Value Date    HCT 45.7 09/22/2021    HCT 46.3 06/26/2019     Lab Results   Component Value Date    MCV 88 09/22/2021    MCV 88 06/26/2019     Lab Results   Component Value Date    MCH 31.2 09/22/2021    MCH 31.6 06/26/2019     Lab Results   Component Value Date    MCHC 35.7 09/22/2021    MCHC 35.9 06/26/2019     Lab Results   Component Value Date    RDW 13.2 09/22/2021    RDW 12.7 06/26/2019     Lab Results   Component Value Date     09/22/2021     06/26/2019     Last Comprehensive Metabolic Panel:  Sodium   Date Value Ref Range Status   09/22/2021 138 133 - 144 mmol/L Final   06/26/2019 138 133 - 144 mmol/L Final     Potassium   Date Value Ref Range Status   09/22/2021 4.8 3.4 - 5.3 mmol/L Final   06/26/2019 4.1 3.4 - 5.3 mmol/L Final     Chloride   Date Value Ref Range Status   09/22/2021 105 94 - 109 mmol/L Final   06/26/2019 103 94 - 109 mmol/L Final     Carbon Dioxide   Date Value Ref Range Status   06/26/2019 26 20 - 32 mmol/L Final     Carbon Dioxide (CO2)   Date Value Ref Range Status   09/22/2021 30 20 - 32 mmol/L Final     Anion Gap   Date Value Ref Range Status   09/22/2021 3 3 - 14 mmol/L Final   06/26/2019 9 3 - 14 mmol/L Final     Glucose   Date Value Ref Range Status   09/22/2021 110 (H) 70 - 99 mg/dL Final   06/26/2019 97 70 - 99 mg/dL Final     Urea Nitrogen   Date Value Ref Range Status   09/22/2021 14 7 - 30 mg/dL Final   06/26/2019 23 7 - 30 mg/dL Final     Creatinine   Date Value Ref Range Status   09/22/2021 0.99 0.66 - 1.25 mg/dL Final   06/26/2019 1.01 0.66 - 1.25 mg/dL Final     GFR Estimate   Date Value Ref Range Status   09/22/2021 >90 >60 mL/min/1.73m2 Final     Comment:     As of July 11, 2021, eGFR is calculated by the CKD-EPI creatinine equation, without race adjustment. eGFR can be influenced by muscle mass, exercise, and diet. The reported eGFR is an estimation only and is only applicable if the renal function is stable.    2019 >90 >60 mL/min/[1.73_m2] Final     Comment:     Non  GFR Calc  Starting 2018, serum creatinine based estimated GFR (eGFR) will be   calculated using the Chronic Kidney Disease Epidemiology Collaboration   (CKD-EPI) equation.       Calcium   Date Value Ref Range Status   2021 9.3 8.5 - 10.1 mg/dL Final   2019 9.7 8.5 - 10.1 mg/dL Final     Lab Results   Component Value Date    AST 23 2021    AST 34 2019     Lab Results   Component Value Date    ALT 60 2021    ALT 69 2019     No results found for: BILICONJ   Lab Results   Component Value Date    BILITOTAL 0.9 2021    BILITOTAL 0.9 2019     Lab Results   Component Value Date    ALBUMIN 4.2 2021    ALBUMIN 4.7 2019     Lab Results   Component Value Date    PROTTOTAL 7.4 2021    PROTTOTAL 8.4 2019      Lab Results   Component Value Date    ALKPHOS 83 2021    ALKPHOS 84 2019     EK Sinus rhythm, anterolateral ST elevation repolarization variant      The patient's records and results personally reviewed by this provider.     Outside records reviewed from: Care Everywhere    Assessment      Yonis Mcnair is a 42 year old male seen as a PAC referral for risk assessment and optimization for anesthesia.    Plan/Recommendations  Pt will be optimized for the proposed procedure.  See below for details on the assessment, risk, and preoperative recommendations    NEUROLOGY  - No history of TIA, CVA or seizure    -Post Op delirium risk factors:  No risk identified    ENT  - No current airway concerns.  Will need to be reassessed day of surgery.  Mallampati: I  TM: > 3    CARDIAC  BP today 127/84. No known cardiac disease. He does report occasional episodes of fast HR after having more than one alcoholic beverage, lasting varying amounts of time. It sometimes makes him feel panic. He was evaluated in Urgent Care a few years ago for this with no findings.  Denies chest pain or DYSPNEA ON EXERTION. Good exercise tolerance. Encouraged him to report to his primary provider if it continues and discussed possibility of Ziopatch or some other longer term monitoring.     - METS (Metabolic Equivalents)>4    RCRI: 0.4% risk of serious cardiac events    PULMONARY  Denies asthma, cough or shortness of breath.  Low risk for ELEANOR  - Tobacco History      History   Smoking Status     Former Smoker     Types: Cigarettes   Smokeless Tobacco     Former User     Comment: social smoker       GI: Denies GERD.  PONV Medium Risk  Total Score: 2           1 AN PONV: Patient is not a current smoker    1 AN PONV: Patient has history of PONV    Has not had PONV with MAC sedation.    /RENAL  - Baseline Creatinine  0.99    ENDOCRINE    - BMI: Estimated body mass index is 24.18 kg/m  as calculated from the following:    Height as of this encounter: 1.829 m (6').    Weight as of this encounter: 80.9 kg (178 lb 4.8 oz).  Healthy Weight (BMI 18.5-24.9)  - No history of Diabetes Mellitus    HEME  VTE Low Risk 0.26%            Total Score: 0      - No history of abnormal bleeding or antiplatelet use.  - Denies history of blood transfusion.     Will take no meds on day of procedure. Instructed to avoid alcohol or marijuana for 24 hours prior to surgery.       The patient is optimized for their procedure. No further diagnostic testing indicated.  AVS with information on surgery time/arrival time, meds and NPO status given by nursing staff. Reviewed surgery packet provided by team.     On the day of service:     Prep time: 15 minutes  Visit time: 16 minutes  Documentation time: 20 minutes  ------------------------------------------  Total time: 51 minutes      GERARDO Perez CNS  Preoperative Assessment Center  [facility]  Phone: [phone]  Fax: [phone]

## 2022-09-14 NOTE — PROGRESS NOTES
Administered HPV9 and Influenza vaccine via R and L deltoid. Patient is due for the 3rd injection of HPV in 1/2023. I scheduled the patient for a follow up at this time.     Sharri Grande, CMA

## 2022-09-30 ENCOUNTER — TRANSFERRED RECORDS (OUTPATIENT)
Dept: HEALTH INFORMATION MANAGEMENT | Facility: CLINIC | Age: 42
End: 2022-09-30

## 2022-09-30 DIAGNOSIS — A63.0 ANAL CONDYLOMA: Primary | ICD-10-CM

## 2022-09-30 RX ORDER — ACETAMINOPHEN 500 MG
1000 TABLET ORAL 4 TIMES DAILY
Qty: 30 TABLET | Refills: 1 | Status: SHIPPED | OUTPATIENT
Start: 2022-09-30 | End: 2022-12-14

## 2022-09-30 RX ORDER — IBUPROFEN 800 MG/1
800 TABLET, FILM COATED ORAL 3 TIMES DAILY
Qty: 30 TABLET | Refills: 1 | Status: SHIPPED | OUTPATIENT
Start: 2022-09-30 | End: 2022-12-14

## 2022-09-30 RX ORDER — ANORECTAL OINTMENT 15.7; .44; 24; 20.6 G/100G; G/100G; G/100G; G/100G
OINTMENT TOPICAL 4 TIMES DAILY PRN
Qty: 113 G | Refills: 3 | Status: SHIPPED | OUTPATIENT
Start: 2022-09-30 | End: 2022-12-14

## 2022-10-01 ENCOUNTER — TELEPHONE (OUTPATIENT)
Dept: SURGERY | Facility: CLINIC | Age: 42
End: 2022-10-01

## 2022-10-01 DIAGNOSIS — A63.0 ANAL CONDYLOMA: Primary | ICD-10-CM

## 2022-10-01 RX ORDER — IBUPROFEN 800 MG/1
800 TABLET, FILM COATED ORAL 3 TIMES DAILY
Qty: 30 TABLET | Refills: 1 | Status: SHIPPED | OUTPATIENT
Start: 2022-10-01 | End: 2022-12-14

## 2022-10-01 RX ORDER — ANORECTAL OINTMENT 15.7; .44; 24; 20.6 G/100G; G/100G; G/100G; G/100G
OINTMENT TOPICAL 4 TIMES DAILY PRN
Qty: 113 G | Refills: 1 | Status: SHIPPED | OUTPATIENT
Start: 2022-10-01 | End: 2022-12-14

## 2022-10-01 RX ORDER — ACETAMINOPHEN 500 MG
500-1000 TABLET ORAL 4 TIMES DAILY
Qty: 30 TABLET | Refills: 1 | Status: SHIPPED | OUTPATIENT
Start: 2022-10-01 | End: 2022-12-14

## 2022-10-01 NOTE — TELEPHONE ENCOUNTER
Patient prescribed medications post-op he is unable to  from the [facility]'s pharmacy the meds were sent to because it's closed for the weekend. He would like the meds routed to [facility] pharmacy instead.     Lidocaine cream, calmoseptine, Tylenol and ibuprofen prescriptions sent.     Lakia Mason  General Surgery Resident, PGY-3

## 2022-10-11 ENCOUNTER — TELEPHONE (OUTPATIENT)
Dept: SURGERY | Facility: CLINIC | Age: 42
End: 2022-10-11

## 2022-12-14 ENCOUNTER — VIRTUAL VISIT (OUTPATIENT)
Dept: FAMILY MEDICINE | Facility: CLINIC | Age: 42
End: 2022-12-14
Payer: COMMERCIAL

## 2022-12-14 DIAGNOSIS — L65.9 ALOPECIA: ICD-10-CM

## 2022-12-14 PROCEDURE — 99213 OFFICE O/P EST LOW 20 MIN: CPT | Mod: 95 | Performed by: NURSE PRACTITIONER

## 2022-12-14 RX ORDER — FINASTERIDE 5 MG/1
TABLET, FILM COATED ORAL
Qty: 23 TABLET | Refills: 4 | Status: SHIPPED | OUTPATIENT
Start: 2022-12-14 | End: 2023-09-20

## 2022-12-14 RX ORDER — LIDOCAINE 50 MG/G
OINTMENT TOPICAL
COMMUNITY
Start: 2022-10-01 | End: 2023-09-20

## 2022-12-14 NOTE — PROGRESS NOTES
Yonis is a 42 year old who is being evaluated via a billable video visit.      How would you like to obtain your AVS? MyChart  If the video visit is dropped, the invitation should be resent by: Text to cell phone: [phone]  Will anyone else be joining your video visit? No      Assessment & Plan     Alopecia    - finasteride (PROSCAR) 5 MG tablet; TAKE 1/4 TABLET BY MOUTH DAILY    20 minutes spent on the date of the encounter doing chart review, history and exam, documentation and further activities per the note       See Patient Instructions: Follow-up as needed for healthcare questions or concerns or in 1 year for medication refills.  Patient in agreement.    Return in about 1 year (around 12/14/2023), or if symptoms worsen or fail to improve.    TUTU NORRIS NP  [facility]    Marci Somers is a 42 year old, presenting for the following health issues:  Recheck Medication    He works as a physical therapist.  Mental health is doing well.  He likes his job.  He takes finasteride for hair growth, he feels it is working well for him.  No side effects of medication.  Previous labs are all within normal limits.  He has no other healthcare questions or concerns.    History of Present Illness       Reason for visit:  Finasteride rx refill    He eats 2-3 servings of fruits and vegetables daily.He consumes 0 sweetened beverage(s) daily.He exercises with enough effort to increase his heart rate 9 or less minutes per day.  He exercises with enough effort to increase his heart rate 3 or less days per week.   He is taking medications regularly.       Medication Followup of Finasteride    Taking Medication as prescribed: yes    Side Effects:  None    Medication Helping Symptoms:  yes    Review of Systems   Constitutional, HEENT, cardiovascular, pulmonary, GI, , musculoskeletal, neuro, skin, endocrine and psych systems are negative, except as otherwise noted.      Objective            Vitals:  No vitals were obtained today due to virtual visit.    Physical Exam   GENERAL: Healthy, alert and no distress  EYES: Eyes grossly normal to inspection.  No discharge or erythema, or obvious scleral/conjunctival abnormalities.  RESP: No audible wheeze, cough, or visible cyanosis.  No visible retractions or increased work of breathing.    SKIN: Visible skin clear. No significant rash, abnormal pigmentation or lesions.  Hair growth looks great.  NEURO: Cranial nerves grossly intact.  Mentation and speech appropriate for age.  PSYCH: Mentation appears normal, affect normal/bright, judgement and insight intact, normal speech and appearance well-groomed.    See orders-labs reviewed previous labs all within normal limits      Video-Visit Details    Video Start Time: 111    Type of service:  Video Visit    Video End Time:1:17 PM    Originating Location (pt. Location): Home    Distant Location (provider location):  Off-site    Platform used for Video Visit: Elbert

## 2022-12-28 ENCOUNTER — OFFICE VISIT (OUTPATIENT)
Dept: OPTOMETRY | Facility: CLINIC | Age: 42
End: 2022-12-28
Payer: COMMERCIAL

## 2022-12-28 DIAGNOSIS — Z98.890 S/P LASIK SURGERY: ICD-10-CM

## 2022-12-28 DIAGNOSIS — H52.223 REGULAR ASTIGMATISM OF BOTH EYES: ICD-10-CM

## 2022-12-28 DIAGNOSIS — Z01.01 ENCOUNTER FOR EXAMINATION OF EYES AND VISION WITH ABNORMAL FINDINGS: Primary | ICD-10-CM

## 2022-12-28 DIAGNOSIS — H52.13 MYOPIA OF BOTH EYES: ICD-10-CM

## 2022-12-28 PROCEDURE — 92015 DETERMINE REFRACTIVE STATE: CPT | Performed by: OPTOMETRIST

## 2022-12-28 PROCEDURE — 92014 COMPRE OPH EXAM EST PT 1/>: CPT | Performed by: OPTOMETRIST

## 2022-12-28 ASSESSMENT — REFRACTION_MANIFEST
OD_CYLINDER: +0.50
OS_CYLINDER: +0.75
OS_AXIS: 081
OS_SPHERE: -0.75
METHOD_AUTOREFRACTION: 1
OS_AXIS: 080
OD_SPHERE: -0.50
OS_CYLINDER: +0.50
OD_AXIS: 062
OD_AXIS: 067
OD_CYLINDER: +0.50
OD_SPHERE: -0.75
OS_SPHERE: -0.75

## 2022-12-28 ASSESSMENT — VISUAL ACUITY
METHOD: SNELLEN - LINEAR
OD_SC: 20/20
OD_SC+: -1
OD_SC: 20/20-2
OS_SC: 20/20
OS_SC: 20/20

## 2022-12-28 ASSESSMENT — TONOMETRY
OS_IOP_MMHG: 14
OD_IOP_MMHG: 15
IOP_METHOD: APPLANATION

## 2022-12-28 ASSESSMENT — CONF VISUAL FIELD
OD_INFERIOR_NASAL_RESTRICTION: 0
OS_NORMAL: 1
OS_INFERIOR_NASAL_RESTRICTION: 0
OD_SUPERIOR_NASAL_RESTRICTION: 0
OS_SUPERIOR_NASAL_RESTRICTION: 0
METHOD: COUNTING FINGERS
OD_INFERIOR_TEMPORAL_RESTRICTION: 0
OD_NORMAL: 1
OD_SUPERIOR_TEMPORAL_RESTRICTION: 0
OS_SUPERIOR_TEMPORAL_RESTRICTION: 0
OS_INFERIOR_TEMPORAL_RESTRICTION: 0

## 2022-12-28 ASSESSMENT — EXTERNAL EXAM - RIGHT EYE: OD_EXAM: NORMAL

## 2022-12-28 ASSESSMENT — KERATOMETRY
OD_AXISANGLE_DEGREES: 075
OD_AXISANGLE2_DEGREES: 165
OS_K1POWER_DIOPTERS: 42.75
OS_AXISANGLE_DEGREES: 084
OS_K2POWER_DIOPTERS: 43.75
OD_K2POWER_DIOPTERS: 44.00
OS_AXISANGLE2_DEGREES: 174
OD_K1POWER_DIOPTERS: 43.75

## 2022-12-28 ASSESSMENT — EXTERNAL EXAM - LEFT EYE: OS_EXAM: NORMAL

## 2022-12-28 ASSESSMENT — SLIT LAMP EXAM - LIDS
COMMENTS: NORMAL
COMMENTS: NORMAL

## 2022-12-28 ASSESSMENT — CUP TO DISC RATIO
OD_RATIO: 0.25
OS_RATIO: 0.25

## 2022-12-28 NOTE — PROGRESS NOTES
Chief Complaint   Patient presents with     Annual Eye Exam      -S/P Lasik - 03/2011 [facility] [place]     Last Eye Exam: 2/5/2020  Dilated Previously: Yes, side effects of dilation explained today    What are you currently using to see?  does not use glasses or contacts       Distance Vision Acuity: Satisfied with vision    Near Vision Acuity: Satisfied with vision while reading and using computer unaided    Eye Comfort: good  Do you use eye drops? : No  Occupation or Hobbies: Physical Therapist     Desirae Sauceda        Medical, surgical and family histories reviewed and updated 12/28/2022.       OBJECTIVE: See Ophthalmology exam    ASSESSMENT:    ICD-10-CM    1. Encounter for examination of eyes and vision with abnormal findings  Z01.01       2. S/P LASIK surgery  Z98.890       3. Myopia of both eyes  H52.13       4. Regular astigmatism of both eyes  H52.223           PLAN:     Patient Instructions   No glasses prescription is necessary at this time.     Return for comprehensive eye exam in 2 years, or sooner if needed.     The effects of the dilating drops last for 4- 6 hours.  You will be more sensitive to light and vision will be blurry up close.  Mydriatic sunglasses were given if needed.      Juan No O.D.  [facility] [place]  [place]  [address]    [phone]

## 2022-12-28 NOTE — PATIENT INSTRUCTIONS
No glasses prescription is necessary at this time.     Return for comprehensive eye exam in 2 years, or sooner if needed.     The effects of the dilating drops last for 4- 6 hours.  You will be more sensitive to light and vision will be blurry up close.  Mydriatic sunglasses were given if needed.      Juan No O.D.  [address]  [place]  [address]    [phone]

## 2022-12-28 NOTE — LETTER
12/28/2022         RE: Yonis Mcnair  [address]  [place] [address]        Dear Colleague,    Thank you for referring your patient, Yonis Mcnair, to the [facility]. Please see a copy of my visit note below.    Chief Complaint   Patient presents with     Annual Eye Exam      -S/P Lasik - 03/2011 [facility] [place]     Last Eye Exam: 2/5/2020  Dilated Previously: Yes, side effects of dilation explained today    What are you currently using to see?  does not use glasses or contacts       Distance Vision Acuity: Satisfied with vision    Near Vision Acuity: Satisfied with vision while reading and using computer unaided    Eye Comfort: good  Do you use eye drops? : No  Occupation or Hobbies: Physical Therapist     Desirae Sauceda        Medical, surgical and family histories reviewed and updated 12/28/2022.       OBJECTIVE: See Ophthalmology exam    ASSESSMENT:    ICD-10-CM    1. Encounter for examination of eyes and vision with abnormal findings  Z01.01       2. S/P LASIK surgery  Z98.890       3. Myopia of both eyes  H52.13       4. Regular astigmatism of both eyes  H52.223           PLAN:     Patient Instructions   No glasses prescription is necessary at this time.     Return for comprehensive eye exam in 2 years, or sooner if needed.     The effects of the dilating drops last for 4- 6 hours.  You will be more sensitive to light and vision will be blurry up close.  Mydriatic sunglasses were given if needed.      Juan No O.D.  [address] [place]  [address]    [phone]             Again, thank you for allowing me to participate in the care of your patient.        Sincerely,        Juan No, OD

## 2023-01-10 ENCOUNTER — MYC MEDICAL ADVICE (OUTPATIENT)
Dept: SURGERY | Facility: CLINIC | Age: 43
End: 2023-01-10

## 2023-01-18 ENCOUNTER — OFFICE VISIT (OUTPATIENT)
Dept: SURGERY | Facility: CLINIC | Age: 43
End: 2023-01-18
Payer: COMMERCIAL

## 2023-01-18 VITALS
WEIGHT: 186.3 LBS | DIASTOLIC BLOOD PRESSURE: 76 MMHG | BODY MASS INDEX: 25.23 KG/M2 | HEART RATE: 77 BPM | HEIGHT: 72 IN | SYSTOLIC BLOOD PRESSURE: 122 MMHG | OXYGEN SATURATION: 99 %

## 2023-01-18 DIAGNOSIS — Z23 NEED FOR HPV VACCINE: Primary | ICD-10-CM

## 2023-01-18 DIAGNOSIS — A63.0 ANAL CONDYLOMA: ICD-10-CM

## 2023-01-18 PROCEDURE — 99213 OFFICE O/P EST LOW 20 MIN: CPT | Mod: 25 | Performed by: NURSE PRACTITIONER

## 2023-01-18 PROCEDURE — 46924 DESTRUCTION ANAL LESION(S): CPT | Performed by: NURSE PRACTITIONER

## 2023-01-18 PROCEDURE — 90471 IMMUNIZATION ADMIN: CPT | Performed by: NURSE PRACTITIONER

## 2023-01-18 PROCEDURE — 90651 9VHPV VACCINE 2/3 DOSE IM: CPT | Performed by: NURSE PRACTITIONER

## 2023-01-18 ASSESSMENT — PAIN SCALES - GENERAL: PAINLEVEL: NO PAIN (0)

## 2023-01-18 NOTE — PROGRESS NOTES
Administered 0.5 mL of Gardasil-9. Last administered 09/14/2022. 3/3 doses administered. Immunization status: up to date and documented.    Most Recent Immunizations   Administered Date(s) Administered     COVID-19 Vaccine 12+ (Pfizer) 10/20/2021     COVID-19 Vaccine Bivalent Booster 12+ (Pfizer) 09/24/2022     Flu, Unspecified 10/20/2020     HPV9 01/18/2023     HepB-Adult 11/27/2002     Influenza (IIV3) PF 09/19/2012     Influenza Vaccine >6 months (Alfuria,Fluzone) 09/14/2022     MMR 06/12/1992     TDAP Vaccine (Adacel) 07/20/2011             Sharri Grande, CMA

## 2023-01-18 NOTE — PROGRESS NOTES
Colon and Rectal Surgery Follow-Up Clinic Note    RE: Yonis Mcnair  : 1980  MALIK: 2022    Yonis Mcnair is a very pleasant 42 year old male here for follow-up of anal condyloma. He is status post evaluation under anesthesia with fulguration of anal condyloma with Dr. Millan in 10/2021 and again with Dr. Pacheco on 22.    Interval history: Yonis has been doing well. He returns today for a wart check. He had a hemorrhoid after his last procedure that has gone away but he feel like it has left some skin behind that he wants to ensure is a skin tag and not another wart. He is a former smoker.    Physical Examination: Exam was chaperoned by Mateusz Mitchell, EMT-P   /76 (BP Location: Left arm, Patient Position: Sitting, Cuff Size: Adult Regular)   Pulse 77   Ht 6'   Wt 186 lb 4.8 oz   SpO2 99%   BMI 25.27 kg/m    General: alert, oriented, in no acute distress, sitting comfortably  HEENT: moist mucous membranes    Perianal external examination:  Perianal skin: Intact with no excoriation or lichenification.  Lesions: No evidence of an external lesion, nodularity, or induration in the perianal region.  Eversion of buttocks: There was not evidence of an anal fissure. Details: N/A.  Skin tags or external hemorrhoids: None.    Digital rectal examination: Was performed.   Sphincter tone: Good.  Palpable lesions: Yes - Location: small palpable posterior lesion  Prostate: Normal.  Other: None.    Anoscopy: Was performed.   Hemorrhoids: No significant internal hemorrhoids.    Procedure:  Prior to the start of the procedure and with procedural staff participation, I verbally confirmed the patient s identity using two indicators, relevant allergies, that the procedure was appropriate and matched the consent or emergent situation, and that the correct equipment/implants were available. Immediately prior to starting the procedure I conducted the Time Out with the procedural staff and re-confirmed the  patient s name, procedure, and site/side. (The Joint Commission universal protocol was followed.)  Yes    Sedation (Moderate or Deep): None     After injection with 1% lidocaine with epinephrine, entire lesion was fulgurated, base was curetted, and re fulguration was performed through the anoscope. Hemostasis was obtained. He tolerated this well.    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 We discussed fulguration in clinic versus the OR again. Since it is an isolated wart, he preferred to have this done in clinic and tolerated it well. Will have him return in 6 months for wart check or anytime in the meantime with new lesions.      Medical history:  Past Medical History:   Diagnosis Date     Anal condyloma      Arthritis Jan 2012     Dyspnea on exertion      Impaired fasting glucose 05/17/2017     Pain in right shoulder     SLAP TEAR     PONV (postoperative nausea and vomiting)        Surgical history:  Past Surgical History:   Procedure Laterality Date     ARTHROSCOPY HIP, OSTEOPLASTY FEMUR PROXIMAL, COMBINED  1/14/2013    Procedure: COMBINED ARTHROSCOPY HIP, OSTEOPLASTY FEMUR PROXIMAL;  Left Hip Arthroscopy, Cam Correction, Labral Repair ,;  Surgeon: Stanley Edwards MD;  [facility]     ARTHROSCOPY SHOULDER SUPERIOR LABRUM ANTERIOR TO POSTERIOR REPAIR  12/30/2011    Procedure:ARTHROSCOPY SHOULDER SUPERIOR LABRUM ANTERIOR TO POSTERIOR REPAIR; Right Shoulder Arthroscopy,   Superior Labral Anteroposterior Repair, Distal Clavicle Excision              ; Surgeon:JAI HERRERA; [facility]     EXAM UNDER ANESTHESIA ANUS N/A 10/1/2021    Procedure: EXAM UNDER ANESTHESIA,;  Surgeon: Isra Millan MD;  Location: [facility] OR     FULGURATE CONDYLOMA RECTUM N/A 10/1/2021    Procedure: excision and fulguration of anal condyloma;  Surgeon: Isra Millan MD;  Location: [facility] OR     HERNIA REPAIR, UMBILICAL  1979     LASIK Bilateral March 2011     [facility] SHOULDER SURG PROC UNLISTED         Problem  list:    Patient Active Problem List    Diagnosis Date Noted     Anal condyloma 08/04/2021     Priority: Medium     Added automatically from request for surgery [id number]       Impaired fasting glucose 05/17/2017     Priority: Medium     S/P LASIK surgery 04/27/2016     Priority: Medium     Hip pain, chronic, left 10/07/2015     Priority: Medium     Alopecia 03/11/2015     Priority: Medium     Problem list name updated by automated process. Provider to review         Medications:  Current Outpatient Medications   Medication Sig Dispense Refill     finasteride (PROSCAR) 5 MG tablet TAKE 1/4 TABLET BY MOUTH DAILY 23 tablet 4     Fish Oil-Cholecalciferol (FISH OIL + D3 PO) Take by mouth every morning       Multiple Vitamins-Minerals (MULTIVITAMIN ADULT PO) Take by mouth every morning       lidocaine (XYLOCAINE) 5 % external ointment APPLY TOPICALLY 4 TIMES DAILY AS NEEDED FOR PAIN (Patient not taking: Reported on 12/14/2022)         Allergies:  Allergies   Allergen Reactions     Amoxicillin Rash       Family history:  Family History   Problem Relation Age of Onset     Lipids Father      Allergies Mother      C.A.D. Maternal Uncle      Musculoskeletal Disorder Paternal Grandfather      Heart Disease Paternal Grandfather      Asthma Maternal Grandfather      Thyroid Disease Paternal Grandmother      Diabetes Paternal Grandmother      Depression Paternal Grandmother      Breast Cancer Maternal Grandmother      Cancer Maternal Grandmother      Hypertension No family hx of      Cerebrovascular Disease No family hx of      Glaucoma No family hx of      Macular Degeneration No family hx of        Social history:  Social History     Tobacco Use     Smoking status: Former     Types: Cigarettes     Smokeless tobacco: Former     Tobacco comments:     social smoker   Substance Use Topics     Alcohol use: Yes     Comment: 5-10 DRINKS PER WEEK     Marital status: .  Occupation: physical therapist.    Nursing Notes:   Harjinder  Sharri  1/18/2023  1:56 PM  Signed  Chief Complaint   Patient presents with     Follow Up       Vitals:    01/18/23 1355   BP: 122/76   BP Location: Left arm   Patient Position: Sitting   Cuff Size: Adult Regular   Pulse: 77   SpO2: 99%   Weight: 186 lb 4.8 oz   Height: 6'       Body mass index is 25.27 kg/m .    Sharri Grande CMA         5 minutes spent on the date of encounter (excluding time performing procedures) performing chart review, history and exam, documentation and further activities as noted above with an additional 10 minutes for the procedure.     Agustina Nolasco, NP-C  Colon and Rectal Surgery  [facility]

## 2023-01-18 NOTE — LETTER
2023       RE: Yonis Mcnair  [address]  [place] [address]     Dear Colleague,    Thank you for referring your patient, Yonis Mcnair, to the [facility] [place] at [facility]. Please see a copy of my visit note below.    Administered 0.5 mL of Gardasil-9. Last administered 2022. 3/3 doses administered. Immunization status: up to date and documented.    Most Recent Immunizations   Administered Date(s) Administered     COVID-19 Vaccine 12+ (Pfizer) 10/20/2021     COVID-19 Vaccine Bivalent Booster 12+ (Pfizer) 2022     Flu, Unspecified 10/20/2020     HPV9 2023     HepB-Adult 2002     Influenza (IIV3) PF 2012     Influenza Vaccine >6 months (Alfuria,Fluzone) 2022     MMR 1992     TDAP Vaccine (Adacel) 2011             Sharri Grande [facility]    Attestation signed by Agustina Hope APRN CNP at 2023  3:31 PM:  GERARDO Villanueva, NP-C  Colon and Rectal Surgery  [facility]      Colon and Rectal Surgery Follow-Up Clinic Note    RE: Yonis Mcnair  : 1980  MALIK: 2022    Yonis Mcnair is a very pleasant 42 year old male here for follow-up of anal condyloma. He is status post evaluation under anesthesia with fulguration of anal condyloma with Dr. Millan in 10/2021 and again with Dr. Pacheco on 22.    Interval history: Yonis has been doing well. He returns today for a wart check. He had a hemorrhoid after his last procedure that has gone away but he feel like it has left some skin behind that he wants to ensure is a skin tag and not another wart. He is a former smoker.    Physical Examination: Exam was chaperoned by Mateusz Mitchell, EMT-P   /76 (BP Location: Left arm, Patient Position: Sitting, Cuff Size: Adult Regular)   Pulse 77   Ht 6'   Wt 186 lb 4.8 oz   SpO2 99%   BMI 25.27  kg/m    General: alert, oriented, in no acute distress, sitting comfortably  HEENT: moist mucous membranes    Perianal external examination:  Perianal skin: Intact with no excoriation or lichenification.  Lesions: No evidence of an external lesion, nodularity, or induration in the perianal region.  Eversion of buttocks: There was not evidence of an anal fissure. Details: N/A.  Skin tags or external hemorrhoids: None.    Digital rectal examination: Was performed.   Sphincter tone: Good.  Palpable lesions: Yes - Location: small palpable posterior lesion  Prostate: Normal.  Other: None.    Anoscopy: Was performed.   Hemorrhoids: No significant internal hemorrhoids.    Procedure:  Prior to the start of the procedure and with procedural staff participation, I verbally confirmed the patient s identity using two indicators, relevant allergies, that the procedure was appropriate and matched the consent or emergent situation, and that the correct equipment/implants were available. Immediately prior to starting the procedure I conducted the Time Out with the procedural staff and re-confirmed the patient s name, procedure, and site/side. (The Joint Commission universal protocol was followed.)  Yes    Sedation (Moderate or Deep): None     After injection with 1% lidocaine with epinephrine, entire lesion was fulgurated, base was curetted, and re fulguration was performed through the anoscope. Hemostasis was obtained. He tolerated this well.    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 We discussed fulguration in clinic versus the OR again. Since it is an isolated wart, he preferred to have this done in clinic and tolerated it well. Will have him return in 6 months for wart check or anytime in the meantime with new lesions.      Medical history:  Past Medical History:   Diagnosis Date     Anal condyloma      Arthritis Jan 2012     Dyspnea on exertion      Impaired fasting glucose 05/17/2017     Pain in right shoulder      SLAP TEAR     PONV (postoperative nausea and vomiting)        Surgical history:  Past Surgical History:   Procedure Laterality Date     ARTHROSCOPY HIP, OSTEOPLASTY FEMUR PROXIMAL, COMBINED  1/14/2013    Procedure: COMBINED ARTHROSCOPY HIP, OSTEOPLASTY FEMUR PROXIMAL;  Left Hip Arthroscopy, Cam Correction, Labral Repair ,;  Surgeon: Stanley Edwards MD;  [facility]     ARTHROSCOPY SHOULDER SUPERIOR LABRUM ANTERIOR TO POSTERIOR REPAIR  12/30/2011    Procedure:ARTHROSCOPY SHOULDER SUPERIOR LABRUM ANTERIOR TO POSTERIOR REPAIR; Right Shoulder Arthroscopy,   Superior Labral Anteroposterior Repair, Distal Clavicle Excision              ; Surgeon:JAI HERRERA; [facility]     EXAM UNDER ANESTHESIA ANUS N/A 10/1/2021    Procedure: EXAM UNDER ANESTHESIA,;  Surgeon: Isra Millan MD;  Location: [facility] OR     FULGURATE CONDYLOMA RECTUM N/A 10/1/2021    Procedure: excision and fulguration of anal condyloma;  Surgeon: Isra Millan MD;  Location: [facility] OR     HERNIA REPAIR, UMBILICAL  1980     LASIK Bilateral March 2011     [facility] SHOULDER SURG PROC UNLISTED         Problem list:    Patient Active Problem List    Diagnosis Date Noted     Anal condyloma 08/04/2021     Priority: Medium     Added automatically from request for surgery [id number]       Impaired fasting glucose 05/17/2017     Priority: Medium     S/P LASIK surgery 04/27/2016     Priority: Medium     Hip pain, chronic, left 10/07/2015     Priority: Medium     Alopecia 03/11/2015     Priority: Medium     Problem list name updated by automated process. Provider to review         Medications:  Current Outpatient Medications   Medication Sig Dispense Refill     finasteride (PROSCAR) 5 MG tablet TAKE 1/4 TABLET BY MOUTH DAILY 23 tablet 4     Fish Oil-Cholecalciferol (FISH OIL + D3 PO) Take by mouth every morning       Multiple Vitamins-Minerals (MULTIVITAMIN ADULT PO) Take by mouth every morning       lidocaine (XYLOCAINE) 5 % external ointment APPLY  TOPICALLY 4 TIMES DAILY AS NEEDED FOR PAIN (Patient not taking: Reported on 12/14/2022)         Allergies:  Allergies   Allergen Reactions     Amoxicillin Rash       Family history:  Family History   Problem Relation Age of Onset     Lipids Father      Allergies Mother      C.A.D. Maternal Uncle      Musculoskeletal Disorder Paternal Grandfather      Heart Disease Paternal Grandfather      Asthma Maternal Grandfather      Thyroid Disease Paternal Grandmother      Diabetes Paternal Grandmother      Depression Paternal Grandmother      Breast Cancer Maternal Grandmother      Cancer Maternal Grandmother      Hypertension No family hx of      Cerebrovascular Disease No family hx of      Glaucoma No family hx of      Macular Degeneration No family hx of        Social history:  Social History     Tobacco Use     Smoking status: Former     Types: Cigarettes     Smokeless tobacco: Former     Tobacco comments:     social smoker   Substance Use Topics     Alcohol use: Yes     Comment: 5-10 DRINKS PER WEEK     Marital status: .  Occupation: physical therapist.    Nursing Notes:   Sharri Grande  1/18/2023  1:56 PM  Signed  Chief Complaint   Patient presents with     Follow Up       Vitals:    01/18/23 1355   BP: 122/76   BP Location: Left arm   Patient Position: Sitting   Cuff Size: Adult Regular   Pulse: 77   SpO2: 99%   Weight: 186 lb 4.8 oz   Height: 6'       Body mass index is 25.27 kg/m .    Sharri Grande CMA         5 minutes spent on the date of encounter (excluding time performing procedures) performing chart review, history and exam, documentation and further activities as noted above with an additional 10 minutes for the procedure.     Agustina Nolasco NP-C  Colon and Rectal Surgery  [facility]

## 2023-01-18 NOTE — NURSING NOTE
Chief Complaint   Patient presents with     Follow Up       Vitals:    01/18/23 1355   BP: 122/76   BP Location: Left arm   Patient Position: Sitting   Cuff Size: Adult Regular   Pulse: 77   SpO2: 99%   Weight: 186 lb 4.8 oz   Height: 6'       Body mass index is 25.27 kg/m .    Sharri Grande CMA

## 2023-04-22 ENCOUNTER — HEALTH MAINTENANCE LETTER (OUTPATIENT)
Age: 43
End: 2023-04-22

## 2023-07-19 ENCOUNTER — OFFICE VISIT (OUTPATIENT)
Dept: SURGERY | Facility: CLINIC | Age: 43
End: 2023-07-19
Payer: COMMERCIAL

## 2023-07-19 VITALS
HEART RATE: 97 BPM | SYSTOLIC BLOOD PRESSURE: 131 MMHG | OXYGEN SATURATION: 98 % | DIASTOLIC BLOOD PRESSURE: 90 MMHG | HEIGHT: 72 IN | WEIGHT: 186 LBS | BODY MASS INDEX: 25.19 KG/M2

## 2023-07-19 DIAGNOSIS — A63.0 ANAL CONDYLOMA: Primary | ICD-10-CM

## 2023-07-19 ASSESSMENT — PAIN SCALES - GENERAL: PAINLEVEL: NO PAIN (0)

## 2023-07-19 NOTE — NURSING NOTE
Chief Complaint   Patient presents with     RECHECK     Wart check       Vitals:    07/19/23 1421   BP: (!) 131/90   BP Location: Left arm   Patient Position: Sitting   Cuff Size: Adult Regular   Pulse: 97   SpO2: 98%   Weight: 186 lb   Height: 6'       Body mass index is 25.23 kg/m .     Mateusz Mitchell, EMT- P

## 2023-07-19 NOTE — PROGRESS NOTES
Colon and Rectal Surgery Follow-Up Clinic Note    RE: Yonis Mcnair  : 1980  MALIK: 2022    Yonis Mcnair is a very pleasant 42 year old male here for follow-up of anal condyloma. He is status post evaluation under anesthesia with fulguration of anal condyloma with Dr. Millan in 10/2021 and again with Dr. Pacheco on 22. I saw him last in clinic 6 months ago with fulguration of an internal anal wart.    Interval history: Yonis has been doing well. He returns today for a wart check. No anorectal complaints. He does have a skin tag that does not bother him a lot. He is a former smoker. He has had anal receptive intercourse in the remote past but only a few times.    Physical Examination: Exam was chaperoned by Mateusz Mitchell, EMT-P   BP (!) 131/90 (BP Location: Left arm, Patient Position: Sitting, Cuff Size: Adult Regular)   Pulse 97   Ht 6'   Wt 186 lb   SpO2 98%   BMI 25.23 kg/m    General: alert, oriented, in no acute distress, sitting comfortably  HEENT: moist mucous membranes    Perianal external examination:  Perianal skin: Intact with no excoriation or lichenification.  Lesions: No evidence of an external lesion, nodularity, or induration in the perianal region.  Eversion of buttocks: There was not evidence of an anal fissure. Details: N/A.  Skin tags or external hemorrhoids: None.    Digital rectal examination: Was performed.   Sphincter tone: Good.  Palpable lesions: Yes - Location: small palpable posterior lesion  Prostate: Normal.  Other: None.    Anoscopy: Was performed.   Hemorrhoids: No significant internal hemorrhoids.    Procedure:  Prior to the start of the procedure and with procedural staff participation, I verbally confirmed the patient s identity using two indicators, relevant allergies, that the procedure was appropriate and matched the consent or emergent situation, and that the correct equipment/implants were available. Immediately prior to starting the procedure I  conducted the Time Out with the procedural staff and re-confirmed the patient s name, procedure, and site/side. (The Joint Commission universal protocol was followed.)  Yes    Sedation (Moderate or Deep): None     After injection with 1% lidocaine with epinephrine, entire lesion was fulgurated, base was curetted, and re fulguration was performed through the Proctostation anoscope. Hemostasis was obtained. He tolerated this well.    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 We discussed fulguration in clinic versus the OR again. Since it is an isolated wart and he tolerated this well in the past, he preferred to have this done in clinic and tolerated it well. He did well with this. We discussed anal Pap for history of anal receptive intercourse but this would need to be done in the absence of warts. He will think more about this as he is not as high of a risk. Will have him return in 6 months for wart check or anytime in the meantime with new lesions.      Medical history:  Past Medical History:   Diagnosis Date     Anal condyloma      Arthritis Jan 2012     Dyspnea on exertion      Impaired fasting glucose 05/17/2017     Pain in right shoulder     SLAP TEAR     PONV (postoperative nausea and vomiting)        Surgical history:  Past Surgical History:   Procedure Laterality Date     ARTHROSCOPY HIP, OSTEOPLASTY FEMUR PROXIMAL, COMBINED  1/14/2013    Procedure: COMBINED ARTHROSCOPY HIP, OSTEOPLASTY FEMUR PROXIMAL;  Left Hip Arthroscopy, Cam Correction, Labral Repair ,;  Surgeon: Stanley Edwards MD;  [facility]     ARTHROSCOPY SHOULDER SUPERIOR LABRUM ANTERIOR TO POSTERIOR REPAIR  12/30/2011    Procedure:ARTHROSCOPY SHOULDER SUPERIOR LABRUM ANTERIOR TO POSTERIOR REPAIR; Right Shoulder Arthroscopy,   Superior Labral Anteroposterior Repair, Distal Clavicle Excision              ; Surgeon:JAI HERRERA; [facility]     EXAM UNDER ANESTHESIA ANUS N/A 10/1/2021    Procedure: EXAM UNDER  ANESTHESIA,;  Surgeon: Isra Millan MD;  Location: [facility] OR     FULGURATE CONDYLOMA RECTUM N/A 10/1/2021    Procedure: excision and fulguration of anal condyloma;  Surgeon: Isra Millan MD;  Location: [facility] OR     HERNIA REPAIR, UMBILICAL  1979     LASIK Bilateral March 2011     [facility] SHOULDER SURG PROC UNLISTED         Problem list:    Patient Active Problem List    Diagnosis Date Noted     Anal condyloma 08/04/2021     Priority: Medium     Added automatically from request for surgery [id number]       Impaired fasting glucose 05/17/2017     Priority: Medium     S/P LASIK surgery 04/27/2016     Priority: Medium     Hip pain, chronic, left 10/07/2015     Priority: Medium     Alopecia 03/11/2015     Priority: Medium     Problem list name updated by automated process. Provider to review         Medications:  Current Outpatient Medications   Medication Sig Dispense Refill     finasteride (PROSCAR) 5 MG tablet TAKE 1/4 TABLET BY MOUTH DAILY 23 tablet 4     Fish Oil-Cholecalciferol (FISH OIL + D3 PO) Take by mouth every morning       lidocaine (XYLOCAINE) 5 % external ointment APPLY TOPICALLY 4 TIMES DAILY AS NEEDED FOR PAIN (Patient not taking: Reported on 12/14/2022)       Multiple Vitamins-Minerals (MULTIVITAMIN ADULT PO) Take by mouth every morning         Allergies:  Allergies   Allergen Reactions     Amoxicillin Rash       Family history:  Family History   Problem Relation Age of Onset     Lipids Father      Allergies Mother      C.A.D. Maternal Uncle      Musculoskeletal Disorder Paternal Grandfather      Heart Disease Paternal Grandfather      Asthma Maternal Grandfather      Thyroid Disease Paternal Grandmother      Diabetes Paternal Grandmother      Depression Paternal Grandmother      Breast Cancer Maternal Grandmother      Cancer Maternal Grandmother      Hypertension No family hx of      Cerebrovascular Disease No family hx of      Glaucoma No family hx of      Macular Degeneration No family hx of         Social history:  Social History     Tobacco Use     Smoking status: Former     Types: Cigarettes     Smokeless tobacco: Former     Tobacco comments:     social smoker   Substance Use Topics     Alcohol use: Yes     Comment: 5-10 DRINKS PER WEEK     Marital status: .  Occupation: physical therapist.    Nursing Notes:   Mateusz Mitchell, EMT  7/19/2023  2:23 PM  Signed  Chief Complaint   Patient presents with     RECHECK     Wart check       Vitals:    07/19/23 1421   BP: (!) 131/90   BP Location: Left arm   Patient Position: Sitting   Cuff Size: Adult Regular   Pulse: 97   SpO2: 98%   Weight: 186 lb   Height: 6'       Body mass index is 25.23 kg/m .     Mateusz Mitchell, EMT- P         10 minutes spent on the date of encounterperforming chart review, history and exam, documentation and further activities as noted above with an additional 10 minutes for the procedure.     Agustina Nolasco, NP-C  Colon and Rectal Surgery  [facility]

## 2023-07-19 NOTE — LETTER
2023       RE: Yonis Mcnair  [address]  [facility] [address]     Dear Colleague,    Thank you for referring your patient, Yonis Mcnair, to the [facility] COLON AND RECTAL SURGERY CLINIC [place] at [facility]. Please see a copy of my visit note below.    Colon and Rectal Surgery Follow-Up Clinic Note    RE: Yonis Mcnair  : 1980  MAILK: 2022    Yonis Mcnair is a very pleasant 42 year old male here for follow-up of anal condyloma. He is status post evaluation under anesthesia with fulguration of anal condyloma with Dr. Millan in 10/2021 and again with Dr. Pacheco on 22. I saw him last in clinic 6 months ago with fulguration of an internal anal wart.    Interval history: Yonis has been doing well. He returns today for a wart check. No anorectal complaints. He does have a skin tag that does not bother him a lot. He is a former smoker. He has had anal receptive intercourse in the remote past but only a few times.    Physical Examination: Exam was chaperoned by Mateusz Mitchell, EMT-P   BP (!) 131/90 (BP Location: Left arm, Patient Position: Sitting, Cuff Size: Adult Regular)   Pulse 97   Ht 6'   Wt 186 lb   SpO2 98%   BMI 25.23 kg/m    General: alert, oriented, in no acute distress, sitting comfortably  HEENT: moist mucous membranes    Perianal external examination:  Perianal skin: Intact with no excoriation or lichenification.  Lesions: No evidence of an external lesion, nodularity, or induration in the perianal region.  Eversion of buttocks: There was not evidence of an anal fissure. Details: N/A.  Skin tags or external hemorrhoids: None.    Digital rectal examination: Was performed.   Sphincter tone: Good.  Palpable lesions: Yes - Location: small palpable posterior lesion  Prostate: Normal.  Other: None.    Anoscopy: Was performed.   Hemorrhoids: No significant internal hemorrhoids.    Procedure:  Prior to the  start of the procedure and with procedural staff participation, I verbally confirmed the patient s identity using two indicators, relevant allergies, that the procedure was appropriate and matched the consent or emergent situation, and that the correct equipment/implants were available. Immediately prior to starting the procedure I conducted the Time Out with the procedural staff and re-confirmed the patient s name, procedure, and site/side. (The Joint Commission universal protocol was followed.)  Yes    Sedation (Moderate or Deep): None     After injection with 1% lidocaine with epinephrine, entire lesion was fulgurated, base was curetted, and re fulguration was performed through the Proctostation anoscope. Hemostasis was obtained. He tolerated this well.    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 We discussed fulguration in clinic versus the OR again. Since it is an isolated wart and he tolerated this well in the past, he preferred to have this done in clinic and tolerated it well. He did well with this. We discussed anal Pap for history of anal receptive intercourse but this would need to be done in the absence of warts. He will think more about this as he is not as high of a risk. Will have him return in 6 months for wart check or anytime in the meantime with new lesions.      Medical history:  Past Medical History:   Diagnosis Date    Anal condyloma     Arthritis Jan 2012    Dyspnea on exertion     Impaired fasting glucose 05/17/2017    Pain in right shoulder     SLAP TEAR    PONV (postoperative nausea and vomiting)        Surgical history:  Past Surgical History:   Procedure Laterality Date    ARTHROSCOPY HIP, OSTEOPLASTY FEMUR PROXIMAL, COMBINED  1/14/2013    Procedure: COMBINED ARTHROSCOPY HIP, OSTEOPLASTY FEMUR PROXIMAL;  Left Hip Arthroscopy, Cam Correction, Labral Repair ,;  Surgeon: Stanley Edwards MD;  [facility]: [facility]    ARTHROSCOPY SHOULDER SUPERIOR LABRUM ANTERIOR TO POSTERIOR REPAIR   12/30/2011    Procedure:ARTHROSCOPY SHOULDER SUPERIOR LABRUM ANTERIOR TO POSTERIOR REPAIR; Right Shoulder Arthroscopy,   Superior Labral Anteroposterior Repair, Distal Clavicle Excision              ; Surgeon:JAI HERRERA; Location:[facility]    EXAM UNDER ANESTHESIA ANUS N/A 10/1/2021    Procedure: EXAM UNDER ANESTHESIA,;  Surgeon: Isra Millan MD;  Location: [facility] OR    FULGURATE CONDYLOMA RECTUM N/A 10/1/2021    Procedure: excision and fulguration of anal condyloma;  Surgeon: Irsa Millan MD;  Location: [facility] OR    HERNIA REPAIR, UMBILICAL  1979    LASIK Bilateral March 2011    [facility] SHOULDER SURG PROC UNLISTED         Problem list:    Patient Active Problem List    Diagnosis Date Noted    Anal condyloma 08/04/2021     Priority: Medium     Added automatically from request for surgery [id number]      Impaired fasting glucose 05/17/2017     Priority: Medium    S/P LASIK surgery 04/27/2016     Priority: Medium    Hip pain, chronic, left 10/07/2015     Priority: Medium    Alopecia 03/11/2015     Priority: Medium     Problem list name updated by automated process. Provider to review         Medications:  Current Outpatient Medications   Medication Sig Dispense Refill    finasteride (PROSCAR) 5 MG tablet TAKE 1/4 TABLET BY MOUTH DAILY 23 tablet 4    Fish Oil-Cholecalciferol (FISH OIL + D3 PO) Take by mouth every morning      lidocaine (XYLOCAINE) 5 % external ointment APPLY TOPICALLY 4 TIMES DAILY AS NEEDED FOR PAIN (Patient not taking: Reported on 12/14/2022)      Multiple Vitamins-Minerals (MULTIVITAMIN ADULT PO) Take by mouth every morning         Allergies:  Allergies   Allergen Reactions    Amoxicillin Rash       Family history:  Family History   Problem Relation Age of Onset    Lipids Father     Allergies Mother     C.A.D. Maternal Uncle     Musculoskeletal Disorder Paternal Grandfather     Heart Disease Paternal Grandfather     Asthma Maternal Grandfather     Thyroid Disease Paternal Grandmother      Diabetes Paternal Grandmother     Depression Paternal Grandmother     Breast Cancer Maternal Grandmother     Cancer Maternal Grandmother     Hypertension No family hx of     Cerebrovascular Disease No family hx of     Glaucoma No family hx of     Macular Degeneration No family hx of        Social history:  Social History     Tobacco Use    Smoking status: Former     Types: Cigarettes    Smokeless tobacco: Former    Tobacco comments:     social smoker   Substance Use Topics    Alcohol use: Yes     Comment: 5-10 DRINKS PER WEEK     Marital status: .  Occupation: physical therapist.    Nursing Notes:   Mateusz Mitchell, EMT  7/19/2023  2:23 PM  Signed  Chief Complaint   Patient presents with    RECHECK     Wart check       Vitals:    07/19/23 1421   BP: (!) 131/90   BP Location: Left arm   Patient Position: Sitting   Cuff Size: Adult Regular   Pulse: 97   SpO2: 98%   Weight: 186 lb   Height: 6'       Body mass index is 25.23 kg/m .     Mateusz Mitchell, EMT- P         10 minutes spent on the date of encounterperforming chart review, history and exam, documentation and further activities as noted above with an additional 10 minutes for the procedure.         Again, thank you for allowing me to participate in the care of your patient.      Sincerely,    GERARDO Rainey CNP

## 2023-09-07 ENCOUNTER — MYC MEDICAL ADVICE (OUTPATIENT)
Dept: FAMILY MEDICINE | Facility: CLINIC | Age: 43
End: 2023-09-07
Payer: COMMERCIAL

## 2023-09-07 NOTE — TELEPHONE ENCOUNTER
"Sent pt message, \"   We will be able to update your tetanus and influenza vaccine. The covid vaccine is not currently showing in your chart as something being needed at this time for you. But we do have covid vaccines in clinic that are available. I hope that somewhat answers your question.\"      Daniella KEENAN MA    "

## 2023-09-07 NOTE — TELEPHONE ENCOUNTER
"Sent pt Browntapehart message., \"You will be able to get your influenza vaccine at your Physical appointment. I do not see that you are due for a Covid booster, but you can talk to Dr. Sutton about it at your visit. Your chart does show that you are due for your Tetanus vaccine.\"      Daniella KEENAN MA    "

## 2023-09-13 ASSESSMENT — ENCOUNTER SYMPTOMS
ARTHRALGIAS: 1
CHILLS: 0
DYSURIA: 0
EYE PAIN: 0
PALPITATIONS: 0
JOINT SWELLING: 0
CONSTIPATION: 0
HEADACHES: 0
ABDOMINAL PAIN: 0
DIZZINESS: 0
COUGH: 0
SORE THROAT: 0
HEMATURIA: 0
SHORTNESS OF BREATH: 0
FEVER: 0
WEAKNESS: 0
NERVOUS/ANXIOUS: 0
FREQUENCY: 0
HEARTBURN: 0
DIARRHEA: 0
HEMATOCHEZIA: 0
MYALGIAS: 1
PARESTHESIAS: 0
NAUSEA: 0

## 2023-09-20 ENCOUNTER — OFFICE VISIT (OUTPATIENT)
Dept: FAMILY MEDICINE | Facility: CLINIC | Age: 43
End: 2023-09-20
Payer: COMMERCIAL

## 2023-09-20 VITALS
WEIGHT: 178.13 LBS | HEART RATE: 83 BPM | BODY MASS INDEX: 24.13 KG/M2 | SYSTOLIC BLOOD PRESSURE: 124 MMHG | RESPIRATION RATE: 16 BRPM | OXYGEN SATURATION: 100 % | DIASTOLIC BLOOD PRESSURE: 77 MMHG | TEMPERATURE: 97.6 F | HEIGHT: 72 IN

## 2023-09-20 DIAGNOSIS — Z11.3 SCREEN FOR STD (SEXUALLY TRANSMITTED DISEASE): ICD-10-CM

## 2023-09-20 DIAGNOSIS — L65.9 ALOPECIA: ICD-10-CM

## 2023-09-20 DIAGNOSIS — Z82.49 FAMILY HISTORY OF ABDOMINAL AORTIC ANEURYSM (AAA): ICD-10-CM

## 2023-09-20 DIAGNOSIS — Z11.59 NEED FOR HEPATITIS C SCREENING TEST: ICD-10-CM

## 2023-09-20 DIAGNOSIS — Z23 ENCOUNTER FOR IMMUNIZATION: ICD-10-CM

## 2023-09-20 DIAGNOSIS — E78.5 HYPERLIPIDEMIA LDL GOAL <100: ICD-10-CM

## 2023-09-20 DIAGNOSIS — Z00.00 ROUTINE GENERAL MEDICAL EXAMINATION AT A HEALTH CARE FACILITY: Primary | ICD-10-CM

## 2023-09-20 DIAGNOSIS — Z12.5 SCREENING FOR PROSTATE CANCER: ICD-10-CM

## 2023-09-20 DIAGNOSIS — Z13.1 SCREENING FOR DIABETES MELLITUS: ICD-10-CM

## 2023-09-20 LAB
ALBUMIN SERPL BCG-MCNC: 4.9 G/DL (ref 3.5–5.2)
ALP SERPL-CCNC: 74 U/L (ref 40–129)
ALT SERPL W P-5'-P-CCNC: 37 U/L (ref 0–70)
ANION GAP SERPL CALCULATED.3IONS-SCNC: 10 MMOL/L (ref 7–15)
AST SERPL W P-5'-P-CCNC: 31 U/L (ref 0–45)
BILIRUB SERPL-MCNC: 0.9 MG/DL
BUN SERPL-MCNC: 20.2 MG/DL (ref 6–20)
CALCIUM SERPL-MCNC: 10.2 MG/DL (ref 8.6–10)
CHLORIDE SERPL-SCNC: 102 MMOL/L (ref 98–107)
CHOLEST SERPL-MCNC: 162 MG/DL
CREAT SERPL-MCNC: 1.06 MG/DL (ref 0.67–1.17)
DEPRECATED HCO3 PLAS-SCNC: 26 MMOL/L (ref 22–29)
EGFRCR SERPLBLD CKD-EPI 2021: 89 ML/MIN/1.73M2
GLUCOSE SERPL-MCNC: 111 MG/DL (ref 70–99)
HBA1C MFR BLD: 5.5 % (ref 0–5.6)
HDLC SERPL-MCNC: 48 MG/DL
LDLC SERPL CALC-MCNC: 99 MG/DL
NONHDLC SERPL-MCNC: 114 MG/DL
POTASSIUM SERPL-SCNC: 4.7 MMOL/L (ref 3.4–5.3)
PROT SERPL-MCNC: 7.9 G/DL (ref 6.4–8.3)
PSA SERPL DL<=0.01 NG/ML-MCNC: 0.66 NG/ML (ref 0–2.5)
SODIUM SERPL-SCNC: 138 MMOL/L (ref 136–145)
T PALLIDUM AB SER QL: NONREACTIVE
TRIGL SERPL-MCNC: 75 MG/DL

## 2023-09-20 PROCEDURE — 90472 IMMUNIZATION ADMIN EACH ADD: CPT | Performed by: FAMILY MEDICINE

## 2023-09-20 PROCEDURE — 86803 HEPATITIS C AB TEST: CPT | Performed by: FAMILY MEDICINE

## 2023-09-20 PROCEDURE — 80061 LIPID PANEL: CPT | Performed by: FAMILY MEDICINE

## 2023-09-20 PROCEDURE — 83036 HEMOGLOBIN GLYCOSYLATED A1C: CPT | Performed by: FAMILY MEDICINE

## 2023-09-20 PROCEDURE — 90715 TDAP VACCINE 7 YRS/> IM: CPT | Performed by: FAMILY MEDICINE

## 2023-09-20 PROCEDURE — 87491 CHLMYD TRACH DNA AMP PROBE: CPT | Performed by: FAMILY MEDICINE

## 2023-09-20 PROCEDURE — 87389 HIV-1 AG W/HIV-1&-2 AB AG IA: CPT | Performed by: FAMILY MEDICINE

## 2023-09-20 PROCEDURE — 99396 PREV VISIT EST AGE 40-64: CPT | Mod: 25 | Performed by: FAMILY MEDICINE

## 2023-09-20 PROCEDURE — G0103 PSA SCREENING: HCPCS | Performed by: FAMILY MEDICINE

## 2023-09-20 PROCEDURE — 36415 COLL VENOUS BLD VENIPUNCTURE: CPT | Performed by: FAMILY MEDICINE

## 2023-09-20 PROCEDURE — 90686 IIV4 VACC NO PRSV 0.5 ML IM: CPT | Performed by: FAMILY MEDICINE

## 2023-09-20 PROCEDURE — 80053 COMPREHEN METABOLIC PANEL: CPT | Performed by: FAMILY MEDICINE

## 2023-09-20 PROCEDURE — 90471 IMMUNIZATION ADMIN: CPT | Performed by: FAMILY MEDICINE

## 2023-09-20 PROCEDURE — 87591 N.GONORRHOEAE DNA AMP PROB: CPT | Performed by: FAMILY MEDICINE

## 2023-09-20 PROCEDURE — 86780 TREPONEMA PALLIDUM: CPT | Performed by: FAMILY MEDICINE

## 2023-09-20 RX ORDER — FINASTERIDE 5 MG/1
TABLET, FILM COATED ORAL
Qty: 23 TABLET | Refills: 4 | Status: SHIPPED | OUTPATIENT
Start: 2023-09-20 | End: 2024-10-02

## 2023-09-20 ASSESSMENT — ENCOUNTER SYMPTOMS
NAUSEA: 0
PALPITATIONS: 0
WEAKNESS: 0
EYE PAIN: 0
CHILLS: 0
DIARRHEA: 0
CONSTIPATION: 0
HEADACHES: 0
PARESTHESIAS: 0
JOINT SWELLING: 0
DIZZINESS: 0
HEMATURIA: 0
ARTHRALGIAS: 1
DYSURIA: 0
SORE THROAT: 0
MYALGIAS: 1
FEVER: 0
NERVOUS/ANXIOUS: 0
HEMATOCHEZIA: 0
ABDOMINAL PAIN: 0
FREQUENCY: 0
COUGH: 0
SHORTNESS OF BREATH: 0
HEARTBURN: 0

## 2023-09-20 ASSESSMENT — PAIN SCALES - GENERAL: PAINLEVEL: NO PAIN (0)

## 2023-09-20 NOTE — PATIENT INSTRUCTIONS
Keep working on healthy diet/exercise     We will send you lab results    Call to schedule ultrasound to rule out AAA    Refill sent in             Preventive Health Recommendations  Male Ages 40 to 49    Yearly exam:             See your health care provider every year in order to  o   Review health changes.   o   Discuss preventive care.    o   Review your medicines if your doctor has prescribed any.  You should be tested each year for STDs (sexually transmitted diseases) if you re at risk.   Have a cholesterol test every 5 years.   Have a colonoscopy (test for colon cancer) if someone in your family has had colon cancer or polyps before age 50.   After age 45, have a diabetes test (fasting glucose). If you are at risk for diabetes, you should have this test every 3 years.    Talk with your health care provider about whether or not a prostate cancer screening test (PSA) is right for you.    Shots: Get a flu shot each year. Get a tetanus shot every 10 years.     Nutrition:  Eat at least 5 servings of fruits and vegetables daily.   Eat whole-grain bread, whole-wheat pasta and brown rice instead of white grains and rice.   Get adequate Calcium and Vitamin D.     Lifestyle  Exercise for at least 150 minutes a week (30 minutes a day, 5 days a week). This will help you control your weight and prevent disease.   Limit alcohol to one drink per day.   No smoking.   Wear sunscreen to prevent skin cancer.   See your dentist every six months for an exam and cleaning.      
-Please follow up with Dr. Alejo via Telehealth on 5/17/21 @9:45am.  The office is located at [facility], [facility], 4th floor. Call us with any questions #[phone].    -Please follow up with Dr. Aguirre on 5/17/21 @11:30am.  The office information is located above.

## 2023-09-20 NOTE — PROGRESS NOTES
SUBJECTIVE:   CC: Yonis is an 43 year old who presents for preventative health visit.       9/20/2023     8:43 AM   Additional Questions   Roomed by Lila Otoole       Healthy Habits:     Getting at least 3 servings of Calcium per day:  Yes    Bi-annual eye exam:  Yes    Dental care twice a year:  Yes    Sleep apnea or symptoms of sleep apnea:  None    Diet:  Regular (no restrictions)    Frequency of exercise:  2-3 days/week    Duration of exercise:  Less than 15 minutes    Taking medications regularly:  Yes    Barriers to taking medications:  None    Medication side effects:  None    Additional concerns today:  No                       Social History     Tobacco Use    Smoking status: Former     Types: Cigarettes    Smokeless tobacco: Former    Tobacco comments:     social smoker   Substance Use Topics    Alcohol use: Yes     Comment: 5-10 DRINKS PER WEEK             9/13/2023     2:03 PM   Alcohol Use   Prescreen: >3 drinks/day or >7 drinks/week? Yes   AUDIT SCORE  8         9/13/2023     2:03 PM   AUDIT - Alcohol Use Disorders Identification Test - Reproduced from the World Health Organization Audit 2001 (Second Edition)   1.  How often do you have a drink containing alcohol? 4 or more times a week   2.  How many drinks containing alcohol do you have on a typical day when you are drinking? 3 or 4   3.  How often do you have five or more drinks on one occasion? Monthly   4.  How often during the last year have you found that you were not able to stop drinking once you had started? Never   5.  How often during the last year have you failed to do what was normally expected of you because of drinking? Never   6.  How often during the last year have you needed a first drink in the morning to get yourself going after a heavy drinking session? Never   7.  How often during the last year have you had a feeling of guilt or remorse after drinking? Less than monthly   8.  How often during the last year have you been unable  to remember what happened the night before because of your drinking? Never   9.  Have you or someone else been injured because of your drinking? No   10. Has a relative, friend, doctor or other health care worker been concerned about your drinking or suggested you cut down? No   TOTAL SCORE 8       Last PSA:   Prostate Specific Antigen Screen   Date Value Ref Range Status   09/22/2021 0.81 0.00 - 4.00 ug/L Final       Reviewed orders with patient. Reviewed health maintenance and updated orders accordingly - Yes      Reviewed and updated as needed this visit by clinical staff   Tobacco  Allergies  Meds              Reviewed and updated as needed this visit by Provider                     Review of Systems   Constitutional:  Negative for chills and fever.   HENT:  Negative for congestion, ear pain, hearing loss and sore throat.    Eyes:  Negative for pain and visual disturbance.   Respiratory:  Negative for cough and shortness of breath.    Cardiovascular:  Negative for chest pain, palpitations and peripheral edema.   Gastrointestinal:  Negative for abdominal pain, constipation, diarrhea, heartburn, hematochezia and nausea.   Genitourinary:  Negative for dysuria, frequency, genital sores, hematuria, impotence, penile discharge and urgency.   Musculoskeletal:  Positive for arthralgias and myalgias. Negative for joint swelling.   Skin:  Negative for rash.   Neurological:  Negative for dizziness, weakness, headaches and paresthesias.   Psychiatric/Behavioral:  Negative for mood changes. The patient is not nervous/anxious.      Hairline stable  On finasteride since about 2009  No side effects    No concerns about joint/ muscle pains    Physical therapist at [employer]    Wants glc checked    Etoh currently   Not more than 4 drinks daily, 14 in a week    Exercise twice per week  Resistance training and elliptical    HIV test requested    Need renewal of med     7 hours of sleep    Energy level good     Had  lasik in past     OBJECTIVE:   /77 (BP Location: Right arm, Patient Position: Chair, Cuff Size: Adult Regular)   Pulse 83   Temp 97.6  F (36.4  C) (Temporal)   Resp 16   Ht 1.829 m (6')   Wt 80.8 kg (178 lb 2 oz)   SpO2 100%   BMI 24.16 kg/m      Physical Exam  GENERAL: healthy, alert and no distress  EYES: Eyes grossly normal to inspection, PERRL and conjunctivae and sclerae normal  HENT: ear canals and TM's normal, nose and mouth without ulcers or lesions  NECK: no adenopathy, no asymmetry, masses, or scars and thyroid normal to palpation  RESP: lungs clear to auscultation - no rales, rhonchi or wheezes  CV: regular rate and rhythm, normal S1 S2, no S3 or S4, no murmur, click or rub, no peripheral edema and peripheral pulses strong  ABDOMEN: soft, nontender, no hepatosplenomegaly, no masses and bowel sounds normal  MS: no gross musculoskeletal defects noted, no edema  SKIN: no suspicious lesions or rashes  NEURO: Normal strength and tone, mentation intact and speech normal  PSYCH: mentation appears normal, affect normal/bright    Diagnostic Test Results:  Labs reviewed in Epic    ASSESSMENT/PLAN:   Yonis was seen today for physical.    Diagnoses and all orders for this visit:    Routine general medical examination at a health care facility    Alopecia  -     finasteride (PROSCAR) 5 MG tablet; TAKE 1/4 TABLET BY MOUTH DAILY    Need for hepatitis C screening test  -     Hepatitis C Screen Reflex to HCV RNA Quant and Genotype; Future  -     Hepatitis C Screen Reflex to HCV RNA Quant and Genotype    Family history of abdominal aortic aneurysm (AAA)  -     US Abdominal Aorta Imaging; Future    Encounter for immunization  -     TDAP 10-64Y (ADACEL,BOOSTRIX)  -     INFLUENZA VACCINE IM > 6 MONTHS VALENT IIV4 (AFLURIA/FLUZONE)  -     SCREENING QUESTIONS FOR ADULT IMMUNIZATIONS    Screen for STD (sexually transmitted disease)  -     HIV Antigen Antibody Combo Cascade; Future  -     Treponema Abs w Reflex to RPR  and Titer; Future  -     NEISSERIA GONORRHOEA PCR; Future  -     CHLAMYDIA TRACHOMATIS PCR; Future  -     HIV Antigen Antibody Combo Cascade  -     Treponema Abs w Reflex to RPR and Titer  -     NEISSERIA GONORRHOEA PCR  -     CHLAMYDIA TRACHOMATIS PCR    Hyperlipidemia LDL goal <100  -     Lipid panel reflex to direct LDL Fasting; Future  -     Comprehensive metabolic panel; Future  -     Lipid panel reflex to direct LDL Fasting  -     Comprehensive metabolic panel    Screening for prostate cancer  -     Prostate Specific Antigen Screen; Future  -     Prostate Specific Antigen Screen    Screening for diabetes mellitus  -     Hemoglobin A1c; Future  -     Hemoglobin A1c    Patient in good overall health  Advise decrease etoh intake  Check labs   Keep working on healthy diet/exercise   He wanted std testing done  He can schedule abdominal ultrasound to rule out AAA  Refill finasteride    Patient has been advised of split billing requirements and indicates understanding: Yes      COUNSELING:   Reviewed preventive health counseling, as reflected in patient instructions       Regular exercise       Healthy diet/nutrition       Vision screening       Safe sex practices/STD prevention       Prostate cancer screening        He reports that he has quit smoking. His smoking use included cigarettes. He has quit using smokeless tobacco.            Leo Sutton MD  [facility]

## 2023-09-21 DIAGNOSIS — E83.52 HYPERCALCEMIA: Primary | ICD-10-CM

## 2023-09-21 LAB
C TRACH DNA SPEC QL NAA+PROBE: NEGATIVE
HCV AB SERPL QL IA: NONREACTIVE
HIV 1+2 AB+HIV1 P24 AG SERPL QL IA: NONREACTIVE
N GONORRHOEA DNA SPEC QL NAA+PROBE: NEGATIVE

## 2023-09-21 NOTE — RESULT ENCOUNTER NOTE
Calcium slightly high.  I added a parathyroid hormone level which is pending    The high urea nitrogen means you were a bit dehydrated when you had your blood drawn.    Stay well hydrated.    Other labs here are fine.      Normal hemoglobin a1c means no diabetes or prediabetes.    HIV still pending.    Leo Sutton MD

## 2023-09-22 ENCOUNTER — MYC MEDICAL ADVICE (OUTPATIENT)
Dept: FAMILY MEDICINE | Facility: CLINIC | Age: 43
End: 2023-09-22
Payer: COMMERCIAL

## 2023-10-04 ENCOUNTER — ANCILLARY PROCEDURE (OUTPATIENT)
Dept: ULTRASOUND IMAGING | Facility: CLINIC | Age: 43
End: 2023-10-04
Attending: FAMILY MEDICINE
Payer: COMMERCIAL

## 2023-10-04 ENCOUNTER — LAB (OUTPATIENT)
Dept: LAB | Facility: CLINIC | Age: 43
End: 2023-10-04
Payer: COMMERCIAL

## 2023-10-04 DIAGNOSIS — Z82.49 FAMILY HISTORY OF ABDOMINAL AORTIC ANEURYSM (AAA): ICD-10-CM

## 2023-10-04 DIAGNOSIS — E83.52 HYPERCALCEMIA: ICD-10-CM

## 2023-10-04 LAB — PTH-INTACT SERPL-MCNC: 15 PG/ML (ref 15–65)

## 2023-10-04 PROCEDURE — 36415 COLL VENOUS BLD VENIPUNCTURE: CPT

## 2023-10-04 PROCEDURE — 83970 ASSAY OF PARATHORMONE: CPT

## 2023-10-04 PROCEDURE — 76775 US EXAM ABDO BACK WALL LIM: CPT | Mod: TC | Performed by: RADIOLOGY

## 2024-01-05 ENCOUNTER — VIRTUAL VISIT (OUTPATIENT)
Dept: FAMILY MEDICINE | Facility: CLINIC | Age: 44
End: 2024-01-05
Payer: COMMERCIAL

## 2024-01-05 DIAGNOSIS — J01.90 ACUTE SINUSITIS WITH SYMPTOMS > 10 DAYS: Primary | ICD-10-CM

## 2024-01-05 PROCEDURE — 99213 OFFICE O/P EST LOW 20 MIN: CPT | Mod: 95 | Performed by: PHYSICIAN ASSISTANT

## 2024-01-05 RX ORDER — FLUTICASONE PROPIONATE 50 MCG
2 SPRAY, SUSPENSION (ML) NASAL DAILY
Qty: 18 ML | Refills: 1 | Status: SHIPPED | OUTPATIENT
Start: 2024-01-05 | End: 2024-10-02

## 2024-01-05 RX ORDER — AZITHROMYCIN 250 MG/1
TABLET, FILM COATED ORAL
Qty: 6 TABLET | Refills: 0 | Status: SHIPPED | OUTPATIENT
Start: 2024-01-05 | End: 2024-01-10

## 2024-01-05 ASSESSMENT — ANXIETY QUESTIONNAIRES
4. TROUBLE RELAXING: NOT AT ALL
3. WORRYING TOO MUCH ABOUT DIFFERENT THINGS: NOT AT ALL
GAD7 TOTAL SCORE: 0
7. FEELING AFRAID AS IF SOMETHING AWFUL MIGHT HAPPEN: NOT AT ALL
8. IF YOU CHECKED OFF ANY PROBLEMS, HOW DIFFICULT HAVE THESE MADE IT FOR YOU TO DO YOUR WORK, TAKE CARE OF THINGS AT HOME, OR GET ALONG WITH OTHER PEOPLE?: NOT DIFFICULT AT ALL
7. FEELING AFRAID AS IF SOMETHING AWFUL MIGHT HAPPEN: NOT AT ALL
1. FEELING NERVOUS, ANXIOUS, OR ON EDGE: NOT AT ALL
IF YOU CHECKED OFF ANY PROBLEMS ON THIS QUESTIONNAIRE, HOW DIFFICULT HAVE THESE PROBLEMS MADE IT FOR YOU TO DO YOUR WORK, TAKE CARE OF THINGS AT HOME, OR GET ALONG WITH OTHER PEOPLE: NOT DIFFICULT AT ALL
2. NOT BEING ABLE TO STOP OR CONTROL WORRYING: NOT AT ALL
6. BECOMING EASILY ANNOYED OR IRRITABLE: NOT AT ALL
5. BEING SO RESTLESS THAT IT IS HARD TO SIT STILL: NOT AT ALL

## 2024-01-05 ASSESSMENT — PATIENT HEALTH QUESTIONNAIRE - PHQ9
SUM OF ALL RESPONSES TO PHQ QUESTIONS 1-9: 2
SUM OF ALL RESPONSES TO PHQ QUESTIONS 1-9: 2
10. IF YOU CHECKED OFF ANY PROBLEMS, HOW DIFFICULT HAVE THESE PROBLEMS MADE IT FOR YOU TO DO YOUR WORK, TAKE CARE OF THINGS AT HOME, OR GET ALONG WITH OTHER PEOPLE: NOT DIFFICULT AT ALL

## 2024-01-05 NOTE — PROGRESS NOTES
Yonis is a 43 year old who is being evaluated via a billable video visit.      How would you like to obtain your AVS? MyChart  If the video visit is dropped, the invitation should be resent by: Text to cell phone: [phone]  Will anyone else be joining your video visit? No        Assessment & Plan     Acute sinusitis with symptoms > 10 days  Presumptively treat with oral antibiotic given length of time and posterior recent COVID status. Over the counter and supportive care discussed with patient and in patient instructions. Return to clinic with any worsening or changes in symptoms and follow up with PCP for routine care.   - azithromycin (ZITHROMAX) 250 MG tablet; Take 2 tablets (500 mg) by mouth daily for 1 day, THEN 1 tablet (250 mg) daily for 4 days.  - fluticasone (FLONASE) 50 MCG/ACT nasal spray; Spray 2 sprays into both nostrils daily    Review of prior external note(s) from - previous routine PCP notes  20 minutes spent by me on the date of the encounter doing chart review, history and exam, documentation and further activities per the note       Patient Instructions   Encouraged mucinex/guafenisin, warm salt water gargles, chloraseptic spray, soothers/lozenges, sinus rinses (neilmed), flonase (2 sprays per nostril daily x 2 weeks), vitamin c, fluids and rest.  May alternate tylenol and NSAIDS (ibuprofen, advil, aleve type products) every 4-6 hours for the next few days as needed.    Prescription for zpack (antibiotic) sent to pharmacy.  Return to clinic with any worsening or changes in symptoms.     Jerry Soriano PA-C  [facility]   Yonis is a 43 year old, presenting for the following health issues:  No chief complaint on file.      History of Present Illness       Reason for visit:  Concern for sinus infection  Symptom onset:  1-3 days ago  Symptoms include:  Facial pressure, eye pressure, mild facial pain, congestion. Minor new fever. Day 10 of covid.  Symptom  intensity:  Mild  Symptom progression:  Staying the same  Had these symptoms before:  Yes  Has tried/received treatment for these symptoms:  Yes  Previous treatment was successful:  Yes  Prior treatment description:  Antibiotic    He eats 2-3 servings of fruits and vegetables daily.He consumes 0 sweetened beverage(s) daily.He exercises with enough effort to increase his heart rate 10 to 19 minutes per day.  He exercises with enough effort to increase his heart rate 3 or less days per week.   He is taking medications regularly.     Still COVID+ Tuesday, but was feeling better then symptoms flared up with more sinus pressure/pain.    Review of Systems   Constitutional, HEENT, cardiovascular, pulmonary, GI, , musculoskeletal, neuro, skin, endocrine and psych systems are negative, except as otherwise noted.      Objective           Vitals:  No vitals were obtained today due to virtual visit.    Physical Exam   GENERAL: Healthy, alert and no distress  EYES: Eyes grossly normal to inspection.  No discharge or erythema, or obvious scleral/conjunctival abnormalities.  RESP: No audible wheeze, cough, or visible cyanosis.  No visible retractions or increased work of breathing.    SKIN: Visible skin clear. No significant rash, abnormal pigmentation or lesions.  NEURO: Cranial nerves grossly intact.  Mentation and speech appropriate for age.  PSYCH: Mentation appears normal, affect normal/bright, judgement and insight intact, normal speech and appearance well-groomed.        Video-Visit Details    Type of service:  Video Visit     Originating Location (pt. Location): Home    Distant Location (provider location):  On-site  Platform used for Video Visit: Elbert

## 2024-01-31 ENCOUNTER — OFFICE VISIT (OUTPATIENT)
Dept: SURGERY | Facility: CLINIC | Age: 44
End: 2024-01-31
Payer: COMMERCIAL

## 2024-01-31 VITALS
DIASTOLIC BLOOD PRESSURE: 81 MMHG | HEART RATE: 82 BPM | RESPIRATION RATE: 16 BRPM | OXYGEN SATURATION: 99 % | SYSTOLIC BLOOD PRESSURE: 125 MMHG

## 2024-01-31 DIAGNOSIS — A63.0 ANAL CONDYLOMA: Primary | ICD-10-CM

## 2024-01-31 PROCEDURE — 99213 OFFICE O/P EST LOW 20 MIN: CPT | Mod: 25 | Performed by: NURSE PRACTITIONER

## 2024-01-31 PROCEDURE — 46924 DESTRUCTION ANAL LESION(S): CPT | Performed by: NURSE PRACTITIONER

## 2024-01-31 ASSESSMENT — PAIN SCALES - GENERAL: PAINLEVEL: NO PAIN (0)

## 2024-01-31 NOTE — NURSING NOTE
Chief Complaint   Patient presents with    RECHECK     Wart check        Vitals:    01/31/24 1347   BP: 125/81   BP Location: Left arm   Patient Position: Sitting   Cuff Size: Adult Regular   Pulse: 82   Resp: 16   SpO2: 99%       There is no height or weight on file to calculate BMI.                          Mino Smalls NRP

## 2024-01-31 NOTE — PROGRESS NOTES
Colon and Rectal Surgery Follow-Up Clinic Note    RE: Yonis Mcnair  : 1980  MALIK: 24    Yonis Mcnair is a very pleasant 43 year old male here for follow-up of anal condyloma. He is status post evaluation under anesthesia with fulguration of anal condyloma with Dr. Millan in 10/2021 and again with Dr. Pacheco on 22. I saw him last in clinic 6 months ago with fulguration of an internal anal wart.    Interval history: Yonis has been doing well. He returns today for a wart check. No anorectal complaints. He does have a skin tag that does not bother him a lot. He is a former smoker. He has had anal receptive intercourse in the remote past but only a few times.    Physical Examination: Exam was chaperoned by Shivam Clark, EMT-P and GEOVANI PerezP   /81 (BP Location: Left arm, Patient Position: Sitting, Cuff Size: Adult Regular)   Pulse 82   Resp 16   SpO2 99%   General: alert, oriented, in no acute distress, sitting comfortably  HEENT: moist mucous membranes    Perianal external examination:  Perianal skin: Intact with no excoriation or lichenification.  Lesions: No evidence of an external lesion, nodularity, or induration in the perianal region.  Eversion of buttocks: There was not evidence of an anal fissure. Details: N/A.  Skin tags or external hemorrhoids: None.    Digital rectal examination: Was performed.   Sphincter tone: Good.  Palpable lesions: Yes - Location: small palpable posterior lesion  Prostate: Normal.  Other: None.    Anoscopy: Was performed.   Hemorrhoids: No significant internal hemorrhoids.  Procedure: Posterior midline anal condyloma    Procedure:  Prior to the start of the procedure and with procedural staff participation, I verbally confirmed the patient s identity using two indicators, relevant allergies, that the procedure was appropriate and matched the consent or emergent situation, and that the correct equipment/implants were available. Immediately  prior to starting the procedure I conducted the Time Out with the procedural staff and re-confirmed the patient s name, procedure, and site/side. (The Joint Commission universal protocol was followed.)  Yes    Sedation (Moderate or Deep): None     After injection with 1% lidocaine with epinephrine, entire lesion was fulgurated, base was curetted, and re fulguration was performed through the Proctostation anoscope. Hemostasis was obtained. He tolerated this well.    Assessment/Plan: 43 year old male with anal condyloma. We discussed fulguration in clinic versus the OR again. Since it is an isolated wart and he tolerated this well in the past, he preferred to have this done in clinic and tolerated it well. He did well with this. Will have him return in 6 months for wart check or anytime in the meantime with new lesions.      Medical history:  Past Medical History:   Diagnosis Date    Anal condyloma     Arthritis Jan 2012    Dyspnea on exertion     Impaired fasting glucose 05/17/2017    Pain in right shoulder     SLAP TEAR    PONV (postoperative nausea and vomiting)        Surgical history:  Past Surgical History:   Procedure Laterality Date    ARTHROSCOPY HIP, OSTEOPLASTY FEMUR PROXIMAL, COMBINED  1/14/2013    Procedure: COMBINED ARTHROSCOPY HIP, OSTEOPLASTY FEMUR PROXIMAL;  Left Hip Arthroscopy, Cam Correction, Labral Repair ,;  Surgeon: Stanley Edwards MD;  [facility]    ARTHROSCOPY SHOULDER SUPERIOR LABRUM ANTERIOR TO POSTERIOR REPAIR  12/30/2011    Procedure:ARTHROSCOPY SHOULDER SUPERIOR LABRUM ANTERIOR TO POSTERIOR REPAIR; Right Shoulder Arthroscopy,   Superior Labral Anteroposterior Repair, Distal Clavicle Excision              ; Surgeon:JAI HERRERA; [facility]    EXAM UNDER ANESTHESIA ANUS N/A 10/1/2021    Procedure: EXAM UNDER ANESTHESIA,;  Surgeon: Isra Millan MD;  Location: [facility] OR    FULGURATE CONDYLOMA RECTUM N/A 10/1/2021    Procedure: excision and fulguration of anal  condyloma;  Surgeon: Isra Millan MD;  Location: [facility] OR    HERNIA REPAIR, UMBILICAL  1980    LASIK Bilateral March 2011    [facility] SHOULDER SURG PROC UNLISTED         Problem list:      Patient Active Problem List    Diagnosis Date Noted    Anal condyloma 08/04/2021     Priority: Medium     Added automatically from request for surgery [id number]      Impaired fasting glucose 05/17/2017     Priority: Medium    S/P LASIK surgery 04/27/2016     Priority: Medium    Hip pain, chronic, left 10/07/2015     Priority: Medium    Alopecia 03/11/2015     Priority: Medium     Problem list name updated by automated process. Provider to review         Medications:  Current Outpatient Medications   Medication Sig Dispense Refill    finasteride (PROSCAR) 5 MG tablet TAKE 1/4 TABLET BY MOUTH DAILY 23 tablet 4    Fish Oil-Cholecalciferol (FISH OIL + D3 PO) Take by mouth every morning      fluticasone (FLONASE) 50 MCG/ACT nasal spray Spray 2 sprays into both nostrils daily 18 mL 1    Multiple Vitamins-Minerals (MULTIVITAMIN ADULT PO) Take by mouth every morning         Allergies:  Allergies   Allergen Reactions    Amoxicillin Rash       Family history:  Family History   Problem Relation Age of Onset    Allergies Mother     Lipids Father     Abdominal Aortic Aneurysm Father         had surgery for this 73    Breast Cancer Maternal Grandmother     Cancer Maternal Grandmother     Asthma Maternal Grandfather     Thyroid Disease Paternal Grandmother     Diabetes Paternal Grandmother     Depression Paternal Grandmother     Musculoskeletal Disorder Paternal Grandfather     Heart Disease Paternal Grandfather     C.A.D. Maternal Uncle     Hypertension No family hx of     Cerebrovascular Disease No family hx of     Glaucoma No family hx of     Macular Degeneration No family hx of        Social history:  Social History     Tobacco Use    Smoking status: Former     Types: Cigarettes    Smokeless tobacco: Former    Tobacco comments:     social  smoker   Substance Use Topics    Alcohol use: Yes     Comment: 5-10 DRINKS PER WEEK     Marital status: .  Occupation: physical therapist.    Nursing Notes:   Mino Smalls  1/31/2024  1:49 PM  Signed  Chief Complaint   Patient presents with    RECHECK     Wart check        Vitals:    01/31/24 1347   BP: 125/81   BP Location: Left arm   Patient Position: Sitting   Cuff Size: Adult Regular   Pulse: 82   Resp: 16   SpO2: 99%       There is no height or weight on file to calculate BMI.                          Mino Smalls NRP     5 minutes spent on the date of encounterperforming chart review, history and exam, documentation and further activities as noted above with an additional 10 minutes for the procedure.     Agustina Nolasco, NP-C  Colon and Rectal Surgery  [facility]

## 2024-01-31 NOTE — LETTER
2024       RE: Yonis Mcnair  [address]  [facility] [address]     Dear Colleague,    Thank you for referring your patient, Yonis Mcnair, to the [facility] RE[facility] [place] [facility]. Please see a copy of my visit note below.    Colon and Rectal Surgery Follow-Up Clinic Note    RE: Yonis Mcnair  : 1980  MALIK: 24    Yonis Mcnair is a very pleasant 43 year old male here for follow-up of anal condyloma. He is status post evaluation under anesthesia with fulguration of anal condyloma with Dr. Millan in 10/2021 and again with Dr. Pacheco on 22. I saw him last in clinic 6 months ago with fulguration of an internal anal wart.    Interval history: Yonis has been doing well. He returns today for a wart check. No anorectal complaints. He does have a skin tag that does not bother him a lot. He is a former smoker. He has had anal receptive intercourse in the remote past but only a few times.    Physical Examination: Exam was chaperoned by Shivam Clark, EMT-P and Mino Smalls NRP   /81 (BP Location: Left arm, Patient Position: Sitting, Cuff Size: Adult Regular)   Pulse 82   Resp 16   SpO2 99%   General: alert, oriented, in no acute distress, sitting comfortably  HEENT: moist mucous membranes    Perianal external examination:  Perianal skin: Intact with no excoriation or lichenification.  Lesions: No evidence of an external lesion, nodularity, or induration in the perianal region.  Eversion of buttocks: There was not evidence of an anal fissure. Details: N/A.  Skin tags or external hemorrhoids: None.    Digital rectal examination: Was performed.   Sphincter tone: Good.  Palpable lesions: Yes - Location: small palpable posterior lesion  Prostate: Normal.  Other: None.    Anoscopy: Was performed.   Hemorrhoids: No significant internal hemorrhoids.  Procedure: Posterior midline anal  condyloma    Procedure:  Prior to the start of the procedure and with procedural staff participation, I verbally confirmed the patient s identity using two indicators, relevant allergies, that the procedure was appropriate and matched the consent or emergent situation, and that the correct equipment/implants were available. Immediately prior to starting the procedure I conducted the Time Out with the procedural staff and re-confirmed the patient s name, procedure, and site/side. (The Joint Commission universal protocol was followed.)  Yes    Sedation (Moderate or Deep): None     After injection with 1% lidocaine with epinephrine, entire lesion was fulgurated, base was curetted, and re fulguration was performed through the Proctostation anoscope. Hemostasis was obtained. He tolerated this well.    Assessment/Plan: 43 year old male with anal condyloma. We discussed fulguration in clinic versus the OR again. Since it is an isolated wart and he tolerated this well in the past, he preferred to have this done in clinic and tolerated it well. He did well with this. Will have him return in 6 months for wart check or anytime in the meantime with new lesions.      Medical history:  Past Medical History:   Diagnosis Date    Anal condyloma     Arthritis Jan 2012    Dyspnea on exertion     Impaired fasting glucose 05/17/2017    Pain in right shoulder     SLAP TEAR    PONV (postoperative nausea and vomiting)        Surgical history:  Past Surgical History:   Procedure Laterality Date    ARTHROSCOPY HIP, OSTEOPLASTY FEMUR PROXIMAL, COMBINED  1/14/2013    Procedure: COMBINED ARTHROSCOPY HIP, OSTEOPLASTY FEMUR PROXIMAL;  Left Hip Arthroscopy, Cam Correction, Labral Repair ,;  Surgeon: Stanely Edwards MD;  [facility]    ARTHROSCOPY SHOULDER SUPERIOR LABRUM ANTERIOR TO POSTERIOR REPAIR  12/30/2011    Procedure:ARTHROSCOPY SHOULDER SUPERIOR LABRUM ANTERIOR TO POSTERIOR REPAIR; Right Shoulder Arthroscopy,   Superior Labral  Anteroposterior Repair, Distal Clavicle Excision              ; Surgeon:JAI HERRERA; [facility]    EXAM UNDER ANESTHESIA ANUS N/A 10/1/2021    Procedure: EXAM UNDER ANESTHESIA,;  Surgeon: Isra Millan MD;  Location: [facility] OR    FULGURATE CONDYLOMA RECTUM N/A 10/1/2021    Procedure: excision and fulguration of anal condyloma;  Surgeon: Isra Millan MD;  Location: [facility] OR    HERNIA REPAIR, UMBILICAL  1980    LASIK Bilateral March 2011    [facility] SHOULDER SURG PROC UNLISTED         Problem list:      Patient Active Problem List    Diagnosis Date Noted    Anal condyloma 08/04/2021     Priority: Medium     Added automatically from request for surgery [id number]      Impaired fasting glucose 05/17/2017     Priority: Medium    S/P LASIK surgery 04/27/2016     Priority: Medium    Hip pain, chronic, left 10/07/2015     Priority: Medium    Alopecia 03/11/2015     Priority: Medium     Problem list name updated by automated process. Provider to review         Medications:  Current Outpatient Medications   Medication Sig Dispense Refill    finasteride (PROSCAR) 5 MG tablet TAKE 1/4 TABLET BY MOUTH DAILY 23 tablet 4    Fish Oil-Cholecalciferol (FISH OIL + D3 PO) Take by mouth every morning      fluticasone (FLONASE) 50 MCG/ACT nasal spray Spray 2 sprays into both nostrils daily 18 mL 1    Multiple Vitamins-Minerals (MULTIVITAMIN ADULT PO) Take by mouth every morning         Allergies:  Allergies   Allergen Reactions    Amoxicillin Rash       Family history:  Family History   Problem Relation Age of Onset    Allergies Mother     Lipids Father     Abdominal Aortic Aneurysm Father         had surgery for this 73    Breast Cancer Maternal Grandmother     Cancer Maternal Grandmother     Asthma Maternal Grandfather     Thyroid Disease Paternal Grandmother     Diabetes Paternal Grandmother     Depression Paternal Grandmother     Musculoskeletal Disorder Paternal Grandfather     Heart Disease Paternal Grandfather      ARLENE Maternal Uncle     Hypertension No family hx of     Cerebrovascular Disease No family hx of     Glaucoma No family hx of     Macular Degeneration No family hx of        Social history:  Social History     Tobacco Use    Smoking status: Former     Types: Cigarettes    Smokeless tobacco: Former    Tobacco comments:     social smoker   Substance Use Topics    Alcohol use: Yes     Comment: 5-10 DRINKS PER WEEK     Marital status: .  Occupation: physical therapist.    Nursing Notes:   Mino Smalls  1/31/2024  1:49 PM  Signed  Chief Complaint   Patient presents with    RECHECK     Wart check        Vitals:    01/31/24 1347   BP: 125/81   BP Location: Left arm   Patient Position: Sitting   Cuff Size: Adult Regular   Pulse: 82   Resp: 16   SpO2: 99%       There is no height or weight on file to calculate BMI.      Mino Smalls NRP     5 minutes spent on the date of encounterperforming chart review, history and exam, documentation and further activities as noted above with an additional 10 minutes for the procedure.           Again, thank you for allowing me to participate in the care of your patient.      Sincerely,    GERARDO Rainey CNP

## 2024-08-01 NOTE — PROGRESS NOTES
Colon and Rectal Surgery Follow-Up Clinic Note    RE: Yonis Mcnair  : 1980  MALIK: 2024    Yonis Mcnair is a very pleasant 43 year old male here for follow-up of anal condyloma. He is status post evaluation under anesthesia with fulguration of anal condyloma with Dr. Millan in 10/2021 and again with Dr. Pacheco on 22. I saw him last in clinic 6 months ago with fulguration of an internal anal wart.    Interval history: Yonis has been doing well. He returns today for a wart check. No anorectal complaints. He does have a skin tag that does not bother him a lot. He is a former smoker. He has had anal receptive intercourse in the remote past but only a few times.    Physical Examination: Exam was chaperoned by Fatemeh Menchaca EMT   /88 (BP Location: Left arm, Patient Position: Sitting, Cuff Size: Adult Regular)   Pulse 80   Ht 6'   Wt 185 lb 11.2 oz   SpO2 99%   BMI 25.19 kg/m    General: alert, oriented, in no acute distress, sitting comfortably  HEENT: moist mucous membranes    Perianal external examination:  Perianal skin: Intact with no excoriation or lichenification.  Lesions: No evidence of an external lesion, nodularity, or induration in the perianal region.  Eversion of buttocks: There was not evidence of an anal fissure. Details: N/A.  Skin tags or external hemorrhoids: None.    Digital rectal examination: Was performed.   Sphincter tone: Good.  Palpable lesions: None  Prostate: Normal.  Other: None.    Anoscopy: Was performed.   Hemorrhoids: No significant internal hemorrhoids.    Assessment/Plan: 43 year old male with history of anal condyloma. No condyloma on exam today. Recheck in 6 months. Should get an anal Pap at age 44 given history of anal intercourse. Patient's questions were answered to his stated satisfaction and he is in agreement with this plan.     Medical history:  Past Medical History:   Diagnosis Date    Anal condyloma     Arthritis 2012    Dyspnea on  exertion     Impaired fasting glucose 05/17/2017    Pain in right shoulder     SLAP TEAR    PONV (postoperative nausea and vomiting)        Surgical history:  Past Surgical History:   Procedure Laterality Date    ARTHROSCOPY HIP, OSTEOPLASTY FEMUR PROXIMAL, COMBINED  1/14/2013    Procedure: COMBINED ARTHROSCOPY HIP, OSTEOPLASTY FEMUR PROXIMAL;  Left Hip Arthroscopy, Cam Correction, Labral Repair ,;  Surgeon: Stanley Edwards MD;  [facility]    ARTHROSCOPY SHOULDER SUPERIOR LABRUM ANTERIOR TO POSTERIOR REPAIR  12/30/2011    Procedure:ARTHROSCOPY SHOULDER SUPERIOR LABRUM ANTERIOR TO POSTERIOR REPAIR; Right Shoulder Arthroscopy,   Superior Labral Anteroposterior Repair, Distal Clavicle Excision              ; Surgeon:JAI HERRERA; [facility]    EXAM UNDER ANESTHESIA ANUS N/A 10/1/2021    Procedure: EXAM UNDER ANESTHESIA,;  Surgeon: Isra Millan MD;  Location: [facility] OR    FULGURATE CONDYLOMA RECTUM N/A 10/1/2021    Procedure: excision and fulguration of anal condyloma;  Surgeon: Isra Millan MD;  Location: [facility] OR    HERNIA REPAIR, UMBILICAL  1980    LASIK Bilateral March 2011    [facility] SHOULDER SURG PROC UNLISTED         Problem list:      Patient Active Problem List    Diagnosis Date Noted    Anal condyloma 08/04/2021     Priority: Medium     Added automatically from request for surgery [id number]      Impaired fasting glucose 05/17/2017     Priority: Medium    S/P LASIK surgery 04/27/2016     Priority: Medium    Hip pain, chronic, left 10/07/2015     Priority: Medium    Alopecia 03/11/2015     Priority: Medium     Problem list name updated by automated process. Provider to review         Medications:  Current Outpatient Medications   Medication Sig Dispense Refill    finasteride (PROSCAR) 5 MG tablet TAKE 1/4 TABLET BY MOUTH DAILY 23 tablet 4    Fish Oil-Cholecalciferol (FISH OIL + D3 PO) Take by mouth every morning      Multiple Vitamins-Minerals (MULTIVITAMIN ADULT PO) Take by mouth every  morning      fluticasone (FLONASE) 50 MCG/ACT nasal spray Spray 2 sprays into both nostrils daily 18 mL 1       Allergies:  Allergies   Allergen Reactions    Amoxicillin Rash       Family history:  Family History   Problem Relation Age of Onset    Allergies Mother     Lipids Father     Abdominal Aortic Aneurysm Father         had surgery for this 73    Breast Cancer Maternal Grandmother     Cancer Maternal Grandmother     Asthma Maternal Grandfather     Thyroid Disease Paternal Grandmother     Diabetes Paternal Grandmother     Depression Paternal Grandmother     Musculoskeletal Disorder Paternal Grandfather     Heart Disease Paternal Grandfather     C.A.D. Maternal Uncle     Hypertension No family hx of     Cerebrovascular Disease No family hx of     Glaucoma No family hx of     Macular Degeneration No family hx of        Social history:  Social History     Tobacco Use    Smoking status: Former     Types: Cigarettes    Smokeless tobacco: Former    Tobacco comments:     social smoker   Substance Use Topics    Alcohol use: Yes     Comment: 5-10 DRINKS PER WEEK     Marital status: .  Occupation: physical therapist.    Nursing Notes:   Fatemeh Menchaca  8/7/2024  8:46 AM  Signed  Chief Complaint   Patient presents with    Follow Up     Wart check       Vitals:    08/07/24 0844   BP: 131/88   BP Location: Left arm   Patient Position: Sitting   Cuff Size: Adult Regular   Pulse: 80   SpO2: 99%   Weight: 185 lb 11.2 oz   Height: 6'       Body mass index is 25.19 kg/m .    OLEG Escalona     10 minutes spent on the date of encounterperforming chart review, history and exam, documentation and further activities as noted above with an additional 2 minutes for anoscopy    ALYSSA Villanueva  Colon and Rectal Surgery  [facility]

## 2024-08-07 ENCOUNTER — OFFICE VISIT (OUTPATIENT)
Dept: SURGERY | Facility: CLINIC | Age: 44
End: 2024-08-07
Payer: COMMERCIAL

## 2024-08-07 VITALS
OXYGEN SATURATION: 99 % | HEART RATE: 80 BPM | SYSTOLIC BLOOD PRESSURE: 131 MMHG | HEIGHT: 72 IN | BODY MASS INDEX: 25.15 KG/M2 | WEIGHT: 185.7 LBS | DIASTOLIC BLOOD PRESSURE: 88 MMHG

## 2024-08-07 DIAGNOSIS — A63.0 ANAL CONDYLOMA: Primary | ICD-10-CM

## 2024-08-07 PROCEDURE — 46600 DIAGNOSTIC ANOSCOPY SPX: CPT | Performed by: NURSE PRACTITIONER

## 2024-08-07 PROCEDURE — 99213 OFFICE O/P EST LOW 20 MIN: CPT | Mod: 25 | Performed by: NURSE PRACTITIONER

## 2024-08-07 ASSESSMENT — PAIN SCALES - GENERAL: PAINLEVEL: NO PAIN (0)

## 2024-08-07 NOTE — NURSING NOTE
Chief Complaint   Patient presents with    Follow Up     Wart check       Vitals:    08/07/24 0844   BP: 131/88   BP Location: Left arm   Patient Position: Sitting   Cuff Size: Adult Regular   Pulse: 80   SpO2: 99%   Weight: 185 lb 11.2 oz   Height: 6'       Body mass index is 25.19 kg/m .    Fatemeh Menchaca, EMT

## 2024-08-07 NOTE — LETTER
2024       RE: Yonis Mcnair  [address]  [facility] [address]       Dear Colleague,    Thank you for referring your patient, Yonis Mcnair, to the [facility] COLON AND RECTAL SURGERY CLINIC [place] at [facility]. Please see a copy of my visit note below.    Colon and Rectal Surgery Follow-Up Clinic Note    RE: Yonis Mcnair  : 1980  MALIK: 2024    Yonis Mcnair is a very pleasant 43 year old male here for follow-up of anal condyloma. He is status post evaluation under anesthesia with fulguration of anal condyloma with Dr. Millan in 10/2021 and again with Dr. Pacheco on 22. I saw him last in clinic 6 months ago with fulguration of an internal anal wart.    Interval history: Yonis has been doing well. He returns today for a wart check. No anorectal complaints. He does have a skin tag that does not bother him a lot. He is a former smoker. He has had anal receptive intercourse in the remote past but only a few times.    Physical Examination: Exam was chaperoned by Fatemeh Menchaca EMT   /88 (BP Location: Left arm, Patient Position: Sitting, Cuff Size: Adult Regular)   Pulse 80   Ht 6'   Wt 185 lb 11.2 oz   SpO2 99%   BMI 25.19 kg/m    General: alert, oriented, in no acute distress, sitting comfortably  HEENT: moist mucous membranes    Perianal external examination:  Perianal skin: Intact with no excoriation or lichenification.  Lesions: No evidence of an external lesion, nodularity, or induration in the perianal region.  Eversion of buttocks: There was not evidence of an anal fissure. Details: N/A.  Skin tags or external hemorrhoids: None.    Digital rectal examination: Was performed.   Sphincter tone: Good.  Palpable lesions: None  Prostate: Normal.  Other: None.    Anoscopy: Was performed.   Hemorrhoids: No significant internal hemorrhoids.    Assessment/Plan: 43 year old male with history of anal condyloma.  No condyloma on exam today. Recheck in 6 months. Should get an anal Pap at age 44 given history of anal intercourse. Patient's questions were answered to his stated satisfaction and he is in agreement with this plan.     Medical history:  Past Medical History:   Diagnosis Date    Anal condyloma     Arthritis Jan 2012    Dyspnea on exertion     Impaired fasting glucose 05/17/2017    Pain in right shoulder     SLAP TEAR    PONV (postoperative nausea and vomiting)        Surgical history:  Past Surgical History:   Procedure Laterality Date    ARTHROSCOPY HIP, OSTEOPLASTY FEMUR PROXIMAL, COMBINED  1/14/2013    Procedure: COMBINED ARTHROSCOPY HIP, OSTEOPLASTY FEMUR PROXIMAL;  Left Hip Arthroscopy, Cam Correction, Labral Repair ,;  Surgeon: Stanley Edwards MD;  [facility]    ARTHROSCOPY SHOULDER SUPERIOR LABRUM ANTERIOR TO POSTERIOR REPAIR  12/30/2011    Procedure:ARTHROSCOPY SHOULDER SUPERIOR LABRUM ANTERIOR TO POSTERIOR REPAIR; Right Shoulder Arthroscopy,   Superior Labral Anteroposterior Repair, Distal Clavicle Excision              ; Surgeon:JAI HERRERA; Location:[facility]    EXAM UNDER ANESTHESIA ANUS N/A 10/1/2021    Procedure: EXAM UNDER ANESTHESIA,;  Surgeon: Isra Millan MD;  Location: [facility] OR    FULGURATE CONDYLOMA RECTUM N/A 10/1/2021    Procedure: excision and fulguration of anal condyloma;  Surgeon: Isra Millan MD;  Location: [facility] OR    HERNIA REPAIR, UMBILICAL  1979    LASIK Bilateral March 2011    [facility] SHOULDER SURG PROC UNLISTED         Problem list:      Patient Active Problem List    Diagnosis Date Noted    Anal condyloma 08/04/2021     Priority: Medium     Added automatically from request for surgery [id number]      Impaired fasting glucose 05/17/2017     Priority: Medium    S/P LASIK surgery 04/27/2016     Priority: Medium    Hip pain, chronic, left 10/07/2015     Priority: Medium    Alopecia 03/11/2015     Priority: Medium     Problem list name updated by automated process.  Provider to review         Medications:  Current Outpatient Medications   Medication Sig Dispense Refill    finasteride (PROSCAR) 5 MG tablet TAKE 1/4 TABLET BY MOUTH DAILY 23 tablet 4    Fish Oil-Cholecalciferol (FISH OIL + D3 PO) Take by mouth every morning      Multiple Vitamins-Minerals (MULTIVITAMIN ADULT PO) Take by mouth every morning      fluticasone (FLONASE) 50 MCG/ACT nasal spray Spray 2 sprays into both nostrils daily 18 mL 1       Allergies:  Allergies   Allergen Reactions    Amoxicillin Rash     Family history:  Family History   Problem Relation Age of Onset    Allergies Mother     Lipids Father     Abdominal Aortic Aneurysm Father         had surgery for this 73    Breast Cancer Maternal Grandmother     Cancer Maternal Grandmother     Asthma Maternal Grandfather     Thyroid Disease Paternal Grandmother     Diabetes Paternal Grandmother     Depression Paternal Grandmother     Musculoskeletal Disorder Paternal Grandfather     Heart Disease Paternal Grandfather     C.A.D. Maternal Uncle     Hypertension No family hx of     Cerebrovascular Disease No family hx of     Glaucoma No family hx of     Macular Degeneration No family hx of      Social history:  Social History     Tobacco Use    Smoking status: Former     Types: Cigarettes    Smokeless tobacco: Former    Tobacco comments:     social smoker   Substance Use Topics    Alcohol use: Yes     Comment: 5-10 DRINKS PER WEEK     Marital status: .  Occupation: physical therapist.    Nursing Notes:   Fatemeh Menchaca  8/7/2024  8:46 AM  Signed  Chief Complaint   Patient presents with    Follow Up     Wart check     Vitals:    08/07/24 0844   BP: 131/88   BP Location: Left arm   Patient Position: Sitting   Cuff Size: Adult Regular   Pulse: 80   SpO2: 99%   Weight: 185 lb 11.2 oz   Height: 6'     Body mass index is 25.19 kg/m .    OLEG Escalona     10 minutes spent on the date of encounterperforming chart review, history and exam, documentation and further  activities as noted above with an additional 2 minutes for anoscopy      Again, thank you for allowing me to participate in the care of your patient.      Sincerely,    GERARDO Rainey CNP

## 2024-09-28 SDOH — HEALTH STABILITY: PHYSICAL HEALTH: ON AVERAGE, HOW MANY MINUTES DO YOU ENGAGE IN EXERCISE AT THIS LEVEL?: 50 MIN

## 2024-09-28 SDOH — HEALTH STABILITY: PHYSICAL HEALTH: ON AVERAGE, HOW MANY DAYS PER WEEK DO YOU ENGAGE IN MODERATE TO STRENUOUS EXERCISE (LIKE A BRISK WALK)?: 2 DAYS

## 2024-09-28 ASSESSMENT — SOCIAL DETERMINANTS OF HEALTH (SDOH): HOW OFTEN DO YOU GET TOGETHER WITH FRIENDS OR RELATIVES?: ONCE A WEEK

## 2024-10-02 ENCOUNTER — OFFICE VISIT (OUTPATIENT)
Dept: FAMILY MEDICINE | Facility: CLINIC | Age: 44
End: 2024-10-02
Payer: COMMERCIAL

## 2024-10-02 VITALS
SYSTOLIC BLOOD PRESSURE: 125 MMHG | WEIGHT: 184.38 LBS | HEIGHT: 72 IN | HEART RATE: 83 BPM | DIASTOLIC BLOOD PRESSURE: 76 MMHG | BODY MASS INDEX: 24.97 KG/M2 | TEMPERATURE: 97.6 F | RESPIRATION RATE: 16 BRPM | OXYGEN SATURATION: 96 %

## 2024-10-02 DIAGNOSIS — Z13.6 CARDIOVASCULAR SCREENING; LDL GOAL LESS THAN 100: ICD-10-CM

## 2024-10-02 DIAGNOSIS — Z12.5 SCREENING FOR PROSTATE CANCER: ICD-10-CM

## 2024-10-02 DIAGNOSIS — Z00.00 ROUTINE GENERAL MEDICAL EXAMINATION AT A HEALTH CARE FACILITY: Primary | ICD-10-CM

## 2024-10-02 DIAGNOSIS — Z12.11 SCREEN FOR COLON CANCER: ICD-10-CM

## 2024-10-02 DIAGNOSIS — R53.83 FATIGUE, UNSPECIFIED TYPE: ICD-10-CM

## 2024-10-02 DIAGNOSIS — L65.9 ALOPECIA: ICD-10-CM

## 2024-10-02 DIAGNOSIS — E83.52 HYPERCALCEMIA: ICD-10-CM

## 2024-10-02 DIAGNOSIS — Z13.1 SCREENING FOR DIABETES MELLITUS: ICD-10-CM

## 2024-10-02 DIAGNOSIS — Z23 ENCOUNTER FOR IMMUNIZATION: ICD-10-CM

## 2024-10-02 LAB
ALBUMIN SERPL BCG-MCNC: 4.6 G/DL (ref 3.5–5.2)
ALP SERPL-CCNC: 77 U/L (ref 40–150)
ALT SERPL W P-5'-P-CCNC: 50 U/L (ref 0–70)
ANION GAP SERPL CALCULATED.3IONS-SCNC: 10 MMOL/L (ref 7–15)
AST SERPL W P-5'-P-CCNC: 35 U/L (ref 0–45)
BASOPHILS # BLD AUTO: 0.1 10E3/UL (ref 0–0.2)
BASOPHILS NFR BLD AUTO: 1 %
BILIRUB SERPL-MCNC: 0.9 MG/DL
BUN SERPL-MCNC: 18.8 MG/DL (ref 6–20)
CALCIUM SERPL-MCNC: 9.9 MG/DL (ref 8.8–10.4)
CHLORIDE SERPL-SCNC: 101 MMOL/L (ref 98–107)
CHOLEST SERPL-MCNC: 195 MG/DL
CREAT SERPL-MCNC: 1.17 MG/DL (ref 0.67–1.17)
EGFRCR SERPLBLD CKD-EPI 2021: 78 ML/MIN/1.73M2
EOSINOPHIL # BLD AUTO: 0.4 10E3/UL (ref 0–0.7)
EOSINOPHIL NFR BLD AUTO: 7 %
ERYTHROCYTE [DISTWIDTH] IN BLOOD BY AUTOMATED COUNT: 12.1 % (ref 10–15)
EST. AVERAGE GLUCOSE BLD GHB EST-MCNC: 114 MG/DL
FASTING STATUS PATIENT QL REPORTED: YES
FASTING STATUS PATIENT QL REPORTED: YES
GLUCOSE SERPL-MCNC: 111 MG/DL (ref 70–99)
HBA1C MFR BLD: 5.6 % (ref 0–5.6)
HCO3 SERPL-SCNC: 25 MMOL/L (ref 22–29)
HCT VFR BLD AUTO: 45.7 % (ref 40–53)
HDLC SERPL-MCNC: 46 MG/DL
HGB BLD-MCNC: 16.1 G/DL (ref 13.3–17.7)
IMM GRANULOCYTES # BLD: 0 10E3/UL
IMM GRANULOCYTES NFR BLD: 0 %
LDLC SERPL CALC-MCNC: 130 MG/DL
LYMPHOCYTES # BLD AUTO: 1.8 10E3/UL (ref 0.8–5.3)
LYMPHOCYTES NFR BLD AUTO: 35 %
MCH RBC QN AUTO: 31.2 PG (ref 26.5–33)
MCHC RBC AUTO-ENTMCNC: 35.2 G/DL (ref 31.5–36.5)
MCV RBC AUTO: 89 FL (ref 78–100)
MONOCYTES # BLD AUTO: 0.4 10E3/UL (ref 0–1.3)
MONOCYTES NFR BLD AUTO: 8 %
NEUTROPHILS # BLD AUTO: 2.4 10E3/UL (ref 1.6–8.3)
NEUTROPHILS NFR BLD AUTO: 48 %
NONHDLC SERPL-MCNC: 149 MG/DL
PLATELET # BLD AUTO: 249 10E3/UL (ref 150–450)
POTASSIUM SERPL-SCNC: 5 MMOL/L (ref 3.4–5.3)
PROT SERPL-MCNC: 7.7 G/DL (ref 6.4–8.3)
PSA SERPL DL<=0.01 NG/ML-MCNC: 0.82 NG/ML (ref 0–2.5)
PTH-INTACT SERPL-MCNC: 18 PG/ML (ref 15–65)
RBC # BLD AUTO: 5.16 10E6/UL (ref 4.4–5.9)
SODIUM SERPL-SCNC: 136 MMOL/L (ref 135–145)
TRIGL SERPL-MCNC: 96 MG/DL
TSH SERPL DL<=0.005 MIU/L-ACNC: 1.15 UIU/ML (ref 0.3–4.2)
WBC # BLD AUTO: 5 10E3/UL (ref 4–11)

## 2024-10-02 PROCEDURE — 36415 COLL VENOUS BLD VENIPUNCTURE: CPT | Performed by: FAMILY MEDICINE

## 2024-10-02 PROCEDURE — 99213 OFFICE O/P EST LOW 20 MIN: CPT | Mod: 25 | Performed by: FAMILY MEDICINE

## 2024-10-02 PROCEDURE — 99396 PREV VISIT EST AGE 40-64: CPT | Mod: 25 | Performed by: FAMILY MEDICINE

## 2024-10-02 PROCEDURE — 90656 IIV3 VACC NO PRSV 0.5 ML IM: CPT | Performed by: FAMILY MEDICINE

## 2024-10-02 PROCEDURE — 83970 ASSAY OF PARATHORMONE: CPT | Performed by: FAMILY MEDICINE

## 2024-10-02 PROCEDURE — 83036 HEMOGLOBIN GLYCOSYLATED A1C: CPT | Performed by: FAMILY MEDICINE

## 2024-10-02 PROCEDURE — 84443 ASSAY THYROID STIM HORMONE: CPT | Performed by: FAMILY MEDICINE

## 2024-10-02 PROCEDURE — G0103 PSA SCREENING: HCPCS | Performed by: FAMILY MEDICINE

## 2024-10-02 PROCEDURE — 85025 COMPLETE CBC W/AUTO DIFF WBC: CPT | Performed by: FAMILY MEDICINE

## 2024-10-02 PROCEDURE — 80061 LIPID PANEL: CPT | Performed by: FAMILY MEDICINE

## 2024-10-02 PROCEDURE — 90471 IMMUNIZATION ADMIN: CPT | Performed by: FAMILY MEDICINE

## 2024-10-02 PROCEDURE — 80053 COMPREHEN METABOLIC PANEL: CPT | Performed by: FAMILY MEDICINE

## 2024-10-02 RX ORDER — FINASTERIDE 5 MG/1
TABLET, FILM COATED ORAL
Qty: 23 TABLET | Refills: 4 | Status: SHIPPED | OUTPATIENT
Start: 2024-10-02

## 2024-10-02 ASSESSMENT — PAIN SCALES - GENERAL: PAINLEVEL: NO PAIN (0)

## 2024-10-02 NOTE — PROGRESS NOTES
Preventive Care Visit  [facility] JOSIAH Sutton MD, Family Medicine  Oct 2, 2024          Marci Somers is a 44 year old, presenting for the following:  Physical (Fasting)        10/2/2024     8:45 AM   Additional Questions   Roomed by Lila Otoole        Health Care Directive  Patient does not have a Health Care Directive or Living Will: Discussed advance care planning with patient; however, patient declined at this time.    HPI  Feeling  well physically    Went to [place] in summer    Skin  check    Fasting    Recheck calcium level     Refill finasteride          9/28/2024   General Health   How would you rate your overall physical health? Excellent   Feel stress (tense, anxious, or unable to sleep) Not at all            9/28/2024   Nutrition   Three or more servings of calcium each day? Yes   Diet: Regular (no restrictions)   How many servings of fruit and vegetables per day? (!) 2-3   How many sweetened beverages each day? 0-1            9/28/2024   Exercise   Days per week of moderate/strenous exercise 2 days   Average minutes spent exercising at this level 50 min      (!) EXERCISE CONCERN      9/28/2024   Social Factors   Frequency of gathering with friends or relatives Once a week   Worry food won't last until get money to buy more No   Food not last or not have enough money for food? No   Do you have housing? (Housing is defined as stable permanent housing and does not include staying ouside in a car, in a tent, in an abandoned building, in an overnight shelter, or couch-surfing.) Yes   Are you worried about losing your housing? No   Lack of transportation? No   Unable to get utilities (heat,electricity)? No            9/28/2024   Dental   Dentist two times every year? Yes            9/28/2024   TB Screening   Were you born outside of the [place]? No              Today's PHQ-2 Score:       1/4/2024     8:51 PM   PHQ-2 ( 1999 [employer])   Q1: Little interest or pleasure in doing things  0   Q2: Feeling down, depressed or hopeless 0   PHQ-2 Score 0   Q1: Little interest or pleasure in doing things Not at all   Q2: Feeling down, depressed or hopeless Not at all   PHQ-2 Score 0         9/28/2024   Substance Use   Alcohol more than 3/day or more than 7/wk Yes   How often do you have a drink containing alcohol 4 or more times a week   How many alcohol drinks on typical day 3 or 4   How often do you have 5+ drinks at one occasion Less than monthly   Audit 2/3 Score 2   How often not able to stop drinking once started Never   How often failed to do what normally expected Never   How often needed first drink in am after a heavy drinking session Never   How often feeling of guilt or remorse after drinking Less than monthly   How often unable to remember what happened the night before Never   Have you or someone else been injured because of your drinking No   Has anyone been concerned or suggested you cut down on drinking No   TOTAL SCORE - AUDIT 7   Do you use any other substances recreationally? No        Social History     Tobacco Use    Smoking status: Former     Types: Cigarettes     Passive exposure: Never    Smokeless tobacco: Former    Tobacco comments:     social smoker   Vaping Use    Vaping status: Never Used   Substance Use Topics    Alcohol use: Yes     Comment: 5-10 DRINKS PER WEEK    Drug use: Yes     Types: Marijuana     Comment: occ           9/28/2024   STI Screening   New sexual partner(s) since last STI/HIV test? No      ASCVD Risk   The 10-year ASCVD risk score (Lazaro PALOMO, et al., 2019) is: 1.4%    Values used to calculate the score:      Age: 44 years      Sex: Male      Is Non- : No      Diabetic: No      Tobacco smoker: No      Systolic Blood Pressure: 125 mmHg      Is BP treated: No      HDL Cholesterol: 48 mg/dL      Total Cholesterol: 162 mg/dL        9/28/2024   Contraception/Family Planning   Questions about contraception or family planning No            Reviewed and updated as needed this visit by Provider                   Resistance  training and interval training    Had the condyloma treated by specialist     Has eye appointment coming up in Dec  Had  lasik    No std  concerns         Objective    Exam  /76 (BP Location: Left arm, Patient Position: Chair, Cuff Size: Adult Regular)   Pulse 83   Temp 97.6  F (36.4  C) (Temporal)   Resp 16   Ht 1.829 m (6')   Wt 83.6 kg (184 lb 6 oz)   SpO2 96%   BMI 25.01 kg/m     Estimated body mass index is 25.01 kg/m  as calculated from the following:    Height as of this encounter: 1.829 m (6').    Weight as of this encounter: 83.6 kg (184 lb 6 oz).    Physical Exam  GENERAL: alert and no distress  EYES: Eyes grossly normal to inspection, PERRL and conjunctivae and sclerae normal  HENT: normal cephalic/atraumatic, nose and mouth without ulcers or lesions, oropharynx clear, oral mucous membranes moist, and tympanic membranes only partially seen due to large amount of wax bilaterally. Patient does not sense any plugging of the ears.   NECK: no adenopathy, no asymmetry, masses, or scars  RESP: lungs clear to auscultation - no rales, rhonchi or wheezes  CV: regular rate and rhythm, normal S1 S2, no S3 or S4, no murmur, click or rub, no peripheral edema  ABDOMEN: soft, nontender, no hepatosplenomegaly, no masses and bowel sounds normal  MS: no gross musculoskeletal defects noted, no edema  SKIN: patient has some scattered moles on back / chest but none that really stand out.  NEURO: Normal strength and tone, mentation intact and speech normal  PSYCH: mentation appears normal, affect normal/bright    1. Routine general medical examination at a health care facility    2. Screen for colon cancer    3. Hypercalcemia    4. Alopecia    5. Encounter for immunization    6. Fatigue, unspecified type    7. CARDIOVASCULAR SCREENING; LDL GOAL LESS THAN 100    8. Screening for prostate cancer    9. Screening for diabetes  mellitus      Overall patient stable  Refill finasteride; holding hairline steady and no side effects   Check labs   Keep working on healthy diet/exercise   No std concern  Flu shot given  He plans on getting covid shot later this fall  Patient to schedule colonoscopy   Advise patient take pictures of his moles every 6 months or so to check for any change     Signed Electronically by: Leo Sutton MD

## 2024-10-02 NOTE — PATIENT INSTRUCTIONS
We will send you lab results    Advise taking occasional pictures of your moles to monitor for changes    Keep working on healthy diet/exercise     Continue finasteride           Patient Education   Preventive Care Advice   This is general advice given by our system to help you stay healthy. However, your care team may have specific advice just for you. Please talk to your care team about your preventive care needs.  Nutrition  Eat 5 or more servings of fruits and vegetables each day.  Try wheat bread, brown rice and whole grain pasta (instead of white bread, rice, and pasta).  Get enough calcium and vitamin D. Check the label on foods and aim for 100% of the RDA (recommended daily allowance).  Lifestyle  Exercise at least 150 minutes each week  (30 minutes a day, 5 days a week).  Do muscle strengthening activities 2 days a week. These help control your weight and prevent disease.  No smoking.  Wear sunscreen to prevent skin cancer.  Have a dental exam and cleaning every 6 months.  Yearly exams  See your health care team every year to talk about:  Any changes in your health.  Any medicines your care team has prescribed.  Preventive care, family planning, and ways to prevent chronic diseases.  Shots (vaccines)   HPV shots (up to age 26), if you've never had them before.  Hepatitis B shots (up to age 59), if you've never had them before.  COVID-19 shot: Get this shot when it's due.  Flu shot: Get a flu shot every year.  Tetanus shot: Get a tetanus shot every 10 years.  Pneumococcal, hepatitis A, and RSV shots: Ask your care team if you need these based on your risk.  Shingles shot (for age 50 and up)  General health tests  Diabetes screening:  Starting at age 35, Get screened for diabetes at least every 3 years.  If you are younger than age 35, ask your care team if you should be screened for diabetes.  Cholesterol test: At age 39, start having a cholesterol test every 5 years, or more often if advised.  Bone  density scan (DEXA): At age 50, ask your care team if you should have this scan for osteoporosis (brittle bones).  Hepatitis C: Get tested at least once in your life.  STIs (sexually transmitted infections)  Before age 24: Ask your care team if you should be screened for STIs.  After age 24: Get screened for STIs if you're at risk. You are at risk for STIs (including HIV) if:  You are sexually active with more than one person.  You don't use condoms every time.  You or a partner was diagnosed with a sexually transmitted infection.  If you are at risk for HIV, ask about PrEP medicine to prevent HIV.  Get tested for HIV at least once in your life, whether you are at risk for HIV or not.  Cancer screening tests  Cervical cancer screening: If you have a cervix, begin getting regular cervical cancer screening tests starting at age 21.  Breast cancer scan (mammogram): If you've ever had breasts, begin having regular mammograms starting at age 40. This is a scan to check for breast cancer.  Colon cancer screening: It is important to start screening for colon cancer at age 45.  Have a colonoscopy test every 10 years (or more often if you're at risk) Or, ask your provider about stool tests like a FIT test every year or Cologuard test every 3 years.  To learn more about your testing options, visit:   .  For help making a decision, visit:   https://bit.ly/oe30100.  Prostate cancer screening test: If you have a prostate, ask your care team if a prostate cancer screening test (PSA) at age 55 is right for you.  Lung cancer screening: If you are a current or former smoker ages 50 to 80, ask your care team if ongoing lung cancer screenings are right for you.  For informational purposes only. Not to replace the advice of your health care provider. Copyright   2023 [employer]. All rights reserved. Clinically reviewed by the [facility]. [employer] 167992 - REV 01/24. 9 Ways to Cut Back on  "Drinking  Maybe you've found yourself drinking more alcohol than you'd prefer. If you want to cut back, here are some ideas to try.    Think before you drink.  Do you really want a drink, or is it just a habit? If you're used to having a drink at a certain time, try doing something else then.     Look for substitutes.  Find some no-alcohol drinks that you enjoy, like flavored seltzer water, tea with honey, or tonic with a slice of lime. Or try alcohol-free beer or \"virgin\" cocktails (without the alcohol).     Drink more water.  Use water to quench your thirst. Drink a glass of water before you have any alcohol. Have another glass along with every drink or between drinks.     Shrink your drink.  For example, have a bottle of beer instead of a pint. Use a smaller glass for wine. Choose drinks with lower alcohol content (ABV%). Or use less liquor and more mixer in cocktails.     Slow down.  It's easy to drink quickly and without thinking about it. Pay attention, and make each drink last longer.     Do the math.  Total up how much you spend on alcohol each month. How much is that a year? If you cut back, what could you do with the money you save?     Take a break.  Choose a day or two each week when you won't drink at all. Notice how you feel on those days, physically and emotionally. How did you sleep? Do you feel better? Over time, add more break days.     Count calories.  Would you like to lose some weight? For some people that's a good motivator for cutting back. Figure out how many calories are in each drink. How many does that add up to in a day? In a week? In a month?     Practice saying no.  Be ready when someone offers you a drink. Try: \"Thanks, I've had enough.\" Or \"Thanks, but I'm cutting back.\" Or \"No, thanks. I feel better when I drink less.\"   Current as of: November 15, 2023  Content Version: 14.1    0084-7400 [employer].   Care instructions adapted under license by your [facility] " professional. If you have questions about a medical condition or this instruction, always ask your healthcare professional. [employer]orporated disclaims any warranty or liability for your use of this information.

## 2024-10-03 NOTE — RESULT ENCOUNTER NOTE
"Calcium back to normal.    LDL \"bad\" cholesterol moderately high.  Keep working on healthy diet/exercise.    Other labs are fine.  Normal hemoglobin a1c means no diabetes or prediabetes.    Leo Sutton MD  "

## 2024-11-20 ENCOUNTER — TELEPHONE (OUTPATIENT)
Dept: GASTROENTEROLOGY | Facility: CLINIC | Age: 44
End: 2024-11-20
Payer: COMMERCIAL

## 2024-11-20 NOTE — TELEPHONE ENCOUNTER
"Endoscopy Scheduling Screen    Have you had any respiratory illness or flu-like symptoms in the last 10 days?  No    What is your communication preference for Instructions and/or Bowel Prep?   MyChart    What insurance is in the chart?  Other:  [facility]    Ordering/Referring Provider: Deal   (If ordering provider performs procedure, schedule with ordering provider unless otherwise instructed. )    BMI: Estimated body mass index is 25.01 kg/m  as calculated from the following:    Height as of 10/2/24: 1.829 m (6').    Weight as of 10/2/24: 83.6 kg (184 lb 6 oz).     Sedation Ordered  moderate sedation.   If patient BMI > 50 do not schedule in [facility].    If patient BMI > 45 do not schedule at [facility].    Are you taking methadone or Suboxone?  NO, No RN review required.    Have you been diagnosed and are being treated for severe PTSD or severe anxiety?  NO, No RN review required.    Are you taking any prescription medications for pain 3 or more times per week?   NO, No RN review required.    Do you have a history of malignant hyperthermia?  No    (Females) Are you currently pregnant?        Have you been diagnosed or told you have pulmonary hypertension?   No    Do you have an LVAD?  No    Have you been told you have moderate to severe sleep apnea?  No.    Have you been told you have COPD, asthma, or any other lung disease?  No    Do you have any heart conditions?  No     Have you ever had or are you waiting for an organ transplant?  No. Continue scheduling, no site restrictions.    Have you had a stroke or transient ischemic attack (TIA aka \"mini stroke\" in the last 6 months?   No    Have you been diagnosed with or been told you have cirrhosis of the liver?   No.    Are you currently on dialysis?   No    Do you need assistance transferring?   No    BMI: Estimated body mass index is 25.01 kg/m  as calculated from the following:    Height as of 10/2/24: 1.829 m (6').    Weight as of 10/2/24: 83.6 kg (184 lb 6 oz).     Is patients " BMI > 40 and scheduling location [facility]?  No    Do you take an injectable or oral medication for weight loss or diabetes (excluding insulin)?  No    Do you take the medication Naltrexone?  No    Do you take blood thinners?  No       Prep   Are you currently on dialysis or do you have chronic kidney disease?  No    Do you have a diagnosis of diabetes?  No    Do you have a diagnosis of cystic fibrosis (CF)?  No    On a regular basis do you go 3 -5 days between bowel movements?  No    BMI > 40?  No    Preferred Pharmacy:    [id number] - [address]  [address] [place] [id number]  Phone: [phone] Fax: [phone]      Final Scheduling Details     Procedure scheduled  Colonoscopy    Surgeon:  Leventhal     Date of procedure:  2/5/25     Pre-OP / PAC:   No - Not required for this site.    Location  [facility] - [facility] - Patient preference.    Sedation   Moderate Sedation - Per order.      Patient Reminders:   You will receive a call from a Nurse to review instructions and health history.  This assessment must be completed prior to your procedure.  Failure to complete the Nurse assessment may result in the procedure being cancelled.      On the day of your procedure, please designate an adult(s) who can drive you home stay with you for the next 24 hours. The medicines used in the exam will make you sleepy. You will not be able to drive.      You cannot take public transportation, ride share services, or non-medical taxi service without a responsible caregiver.  Medical transport services are allowed with the requirement that a responsible caregiver will receive you at your destination.  We require that drivers and caregivers are confirmed prior to your procedure.

## 2024-12-04 ENCOUNTER — OFFICE VISIT (OUTPATIENT)
Dept: OPTOMETRY | Facility: CLINIC | Age: 44
End: 2024-12-04
Payer: COMMERCIAL

## 2024-12-04 DIAGNOSIS — H52.223 REGULAR ASTIGMATISM OF BOTH EYES: ICD-10-CM

## 2024-12-04 DIAGNOSIS — Z98.890 S/P LASIK SURGERY: ICD-10-CM

## 2024-12-04 DIAGNOSIS — Z01.01 ENCOUNTER FOR EXAMINATION OF EYES AND VISION WITH ABNORMAL FINDINGS: Primary | ICD-10-CM

## 2024-12-04 DIAGNOSIS — H52.13 MYOPIA OF BOTH EYES: ICD-10-CM

## 2024-12-04 PROCEDURE — 92014 COMPRE OPH EXAM EST PT 1/>: CPT | Performed by: OPTOMETRIST

## 2024-12-04 PROCEDURE — 92015 DETERMINE REFRACTIVE STATE: CPT | Performed by: OPTOMETRIST

## 2024-12-04 ASSESSMENT — TONOMETRY
OD_IOP_MMHG: 14
IOP_METHOD: APPLANATION
OS_IOP_MMHG: 14

## 2024-12-04 ASSESSMENT — CONF VISUAL FIELD
OS_NORMAL: 1
METHOD: COUNTING FINGERS
OD_INFERIOR_NASAL_RESTRICTION: 0
OS_SUPERIOR_NASAL_RESTRICTION: 0
OS_INFERIOR_NASAL_RESTRICTION: 0
OD_SUPERIOR_TEMPORAL_RESTRICTION: 0
OS_SUPERIOR_TEMPORAL_RESTRICTION: 0
OD_SUPERIOR_NASAL_RESTRICTION: 0
OD_NORMAL: 1
OS_INFERIOR_TEMPORAL_RESTRICTION: 0
OD_INFERIOR_TEMPORAL_RESTRICTION: 0

## 2024-12-04 ASSESSMENT — REFRACTION_MANIFEST
OS_AXIS: 082
OS_SPHERE: -0.75
OD_SPHERE: -0.75
OD_CYLINDER: +0.50
OS_CYLINDER: +0.50
OD_AXIS: 083

## 2024-12-04 ASSESSMENT — VISUAL ACUITY
METHOD: SNELLEN - LINEAR
OS_SC: 20/20
OD_SC: 20/25
OD_SC+: -1
OS_SC+: -2
OS_SC: 20/20-2
OD_SC: 20/20-1

## 2024-12-04 ASSESSMENT — CUP TO DISC RATIO
OS_RATIO: 0.25
OD_RATIO: 0.25

## 2024-12-04 ASSESSMENT — SLIT LAMP EXAM - LIDS
COMMENTS: NORMAL
COMMENTS: NORMAL

## 2024-12-04 ASSESSMENT — EXTERNAL EXAM - LEFT EYE: OS_EXAM: NORMAL

## 2024-12-04 ASSESSMENT — EXTERNAL EXAM - RIGHT EYE: OD_EXAM: NORMAL

## 2024-12-04 NOTE — PATIENT INSTRUCTIONS
No prescription glasses necessary.   OK to use OTC reading glasses, if needed.     Return in 2 years for a comprehensive eye exam, or sooner if needed.        The effects of the dilating drops last for 4- 6 hours.  You will be more sensitive to light and vision will be blurry up close.  Mydriatic sunglasses were given if needed.     Juan No, OD  [facility]  [address] [place] [address]. [place]  LUKE Gutierrez  [id number]    [phone]

## 2024-12-04 NOTE — LETTER
12/4/2024      Yonis Mcnair  [address]  [facility] [address]      Dear Colleague,    Thank you for referring your patient, Yonis Mcnair, to the [facility]. Please see a copy of my visit note below.    Chief Complaint   Patient presents with     Annual Eye Exam      -S/P Lasik each eye 2011 - Lasik Plus MG     Last Eye Exam: 12/28/2022 Dr. No   Dilated Previously: Yes, side effects of dilation explained today - would like 0.5% drops - has meeting at 11    What are you currently using to see?  does not use glasses or contacts     Distance Vision Acuity: Satisfied with vision overall - noticing increase in glare while driving at night especially if the road is wet     Near Vision Acuity: Not satisfied - fine print more difficult - ongoing for ~6 months     Eye Comfort: good  Do you use eye drops? : No  Occupation or Hobbies: PT    Desirae Sauceda  Optometry Assistant        Medical, surgical and family histories reviewed and updated 12/4/2024.       OBJECTIVE: See Ophthalmology exam    ASSESSMENT:    ICD-10-CM    1. Encounter for examination of eyes and vision with abnormal findings  Z01.01       2. S/P LASIK surgery  Z98.890       3. Myopia of both eyes  H52.13       4. Regular astigmatism of both eyes  H52.223           PLAN:     Patient Instructions   No prescription glasses necessary.   OK to use OTC reading glasses, if needed.     Return in 2 years for a comprehensive eye exam, or sooner if needed.        The effects of the dilating drops last for 4- 6 hours.  You will be more sensitive to light and vision will be blurry up close.  Mydriatic sunglasses were given if needed.     Juan No, RUFINO  [facility]  [address] [place]  [address]    [phone]       Again, thank you for allowing me to participate in the care of your patient.        Sincerely,        Juan No, RUFINO

## 2024-12-04 NOTE — PROGRESS NOTES
Chief Complaint   Patient presents with    Annual Eye Exam      -S/P Lasik each eye 2011 - Lasik Plus MG     Last Eye Exam: 12/28/2022 Dr. No   Dilated Previously: Yes, side effects of dilation explained today - would like 0.5% drops - has meeting at 11    What are you currently using to see?  does not use glasses or contacts     Distance Vision Acuity: Satisfied with vision overall - noticing increase in glare while driving at night especially if the road is wet     Near Vision Acuity: Not satisfied - fine print more difficult - ongoing for ~6 months     Eye Comfort: good  Do you use eye drops? : No  Occupation or Hobbies: PT    Desirae Sauceda  Optometry Assistant        Medical, surgical and family histories reviewed and updated 12/4/2024.       OBJECTIVE: See Ophthalmology exam    ASSESSMENT:    ICD-10-CM    1. Encounter for examination of eyes and vision with abnormal findings  Z01.01       2. S/P LASIK surgery  Z98.890       3. Myopia of both eyes  H52.13       4. Regular astigmatism of both eyes  H52.223           PLAN:     Patient Instructions   No prescription glasses necessary.   OK to use OTC reading glasses, if needed.     Return in 2 years for a comprehensive eye exam, or sooner if needed.        The effects of the dilating drops last for 4- 6 hours.  You will be more sensitive to light and vision will be blurry up close.  Mydriatic sunglasses were given if needed.     Juan No, OD  [facility]  [address] [place] [address]. [place]  [id number]    [phone]

## 2025-01-09 ENCOUNTER — TELEPHONE (OUTPATIENT)
Dept: SURGERY | Facility: CLINIC | Age: 45
End: 2025-01-09
Payer: COMMERCIAL

## 2025-01-09 NOTE — TELEPHONE ENCOUNTER
Left Voicemail (1st Attempt) and Sent Mychart (1st Attempt) for the patient to call back and schedule the following:    Appointment type: RET Patient  Provider: Agustina Baeza NP   Return date: 02/12/2025  Specialty phone number: [phone]  Additional appointment(s) needed: n/a  Additonal Notes: Provider unavailable, pt needs to reschedule   ( 03/11/2025 at 1100 am )

## 2025-01-22 ENCOUNTER — TELEPHONE (OUTPATIENT)
Dept: GASTROENTEROLOGY | Facility: CLINIC | Age: 45
End: 2025-01-22
Payer: COMMERCIAL

## 2025-01-22 NOTE — TELEPHONE ENCOUNTER
Staff message sent to Dr. Escobar for sedation review.     Routine general exam with pcp on 10/2/24 noted that patient consumes 3-4 alcoholic beverages in a day. Audit score 7. Seeking if ok to proceed as scheduled with CS or if MAC sedation is recommended.     ---------------------------------------------------------------------------------------  Pre visit planning completed.      Procedure details:    Patient scheduled for Colonoscopy on 2/5/25.     Arrival time: 0700. Procedure time 0800    [facility] [address] [place] [address], [place] [address]. Check in location: 5th Floor.    Sedation type: Conscious sedation     Pre op exam needed? No.    Indication for procedure: screening       Chart review:     Electronic implanted devices? No    Recent diagnosis of diverticulitis within the last 6 weeks? No      Medication review:    Diabetic? No    Anticoagulants? No    Weight loss medication/injectable? No GLP-1 medication per patient's medication list. Nursing to verify with pre-assessment call.    Other medication HOLDING recommendations:  N/A      Prep for procedure:     Bowel prep recommendation: Standard Miralax.   Due to: standard bowel prep    Procedure information and instructions sent via [employer] - bowel prep instructions sent only. Awaiting provider response.         Nhung Eisenberg RN  Endoscopy Procedure Pre Assessment   [phone] option 2

## 2025-01-23 NOTE — TELEPHONE ENCOUNTER
Pre assessment completed for upcoming procedure.   (Please see previous telephone encounter notes for complete details)    Patient returned call.       Procedure details:    Arrival time and facility location reviewed.    Pre op exam needed? No.    Designated  policy reviewed. Instructed to have someone stay 6  hours post procedure.       Medication review:    Medications reviewed. Please see supporting documentation below. Holding recommendations discussed (if applicable).       Prep for procedure:     Procedure prep instructions reviewed.        Any additional information needed:  N/A      Patient verbalized understanding and had no questions or concerns at this time.      Tracy Najera LPN  Endoscopy Procedure Pre Assessment   [phone] option 2

## 2025-01-23 NOTE — TELEPHONE ENCOUNTER
"Response received from scoping provider Dr. Escobar:    \"CS is ok\"    ---------------------------------------------------------------------------------------  Procedure details sent via [employer].     Nhung Eisenberg RN  Endoscopy Procedure Pre Assessment   [phone] option 2     "

## 2025-01-23 NOTE — TELEPHONE ENCOUNTER
Attempted to contact patient in order to complete pre assessment questions.     No answer. Left message to return call to [phone] option 2    Callback communication sent via [employer].      Ninoska Garcia LPN  Endoscopy Procedure Pre Assessment

## 2025-02-04 ENCOUNTER — ANESTHESIA EVENT (OUTPATIENT)
Dept: SURGERY | Facility: AMBULATORY SURGERY CENTER | Age: 45
End: 2025-02-04
Payer: COMMERCIAL

## 2025-02-05 ENCOUNTER — ANESTHESIA (OUTPATIENT)
Dept: SURGERY | Facility: AMBULATORY SURGERY CENTER | Age: 45
End: 2025-02-05
Payer: COMMERCIAL

## 2025-02-05 ENCOUNTER — HOSPITAL ENCOUNTER (OUTPATIENT)
Facility: AMBULATORY SURGERY CENTER | Age: 45
Discharge: HOME | End: 2025-02-05
Attending: INTERNAL MEDICINE | Admitting: INTERNAL MEDICINE
Payer: COMMERCIAL

## 2025-02-05 VITALS — HEART RATE: 67 BPM

## 2025-02-05 VITALS
HEART RATE: 81 BPM | RESPIRATION RATE: 15 BRPM | OXYGEN SATURATION: 100 % | BODY MASS INDEX: 26.41 KG/M2 | TEMPERATURE: 96.8 F | DIASTOLIC BLOOD PRESSURE: 76 MMHG | SYSTOLIC BLOOD PRESSURE: 107 MMHG | HEIGHT: 72 IN | WEIGHT: 195 LBS

## 2025-02-05 LAB — COLONOSCOPY: NORMAL

## 2025-02-05 PROCEDURE — 88305 TISSUE EXAM BY PATHOLOGIST: CPT | Mod: 26 | Performed by: STUDENT IN AN ORGANIZED HEALTH CARE EDUCATION/TRAINING PROGRAM

## 2025-02-05 PROCEDURE — 88305 TISSUE EXAM BY PATHOLOGIST: CPT | Mod: TC | Performed by: INTERNAL MEDICINE

## 2025-02-05 RX ORDER — LIDOCAINE HYDROCHLORIDE 20 MG/ML
INJECTION, SOLUTION INFILTRATION; PERINEURAL PRN
Status: DISCONTINUED | OUTPATIENT
Start: 2025-02-05 | End: 2025-02-05

## 2025-02-05 RX ORDER — LIDOCAINE 40 MG/G
CREAM TOPICAL
Status: DISCONTINUED | OUTPATIENT
Start: 2025-02-05 | End: 2025-02-05 | Stop reason: HOSPADM

## 2025-02-05 RX ORDER — ONDANSETRON 2 MG/ML
4 INJECTION INTRAMUSCULAR; INTRAVENOUS EVERY 6 HOURS PRN
Status: DISCONTINUED | OUTPATIENT
Start: 2025-02-05 | End: 2025-02-06 | Stop reason: HOSPADM

## 2025-02-05 RX ORDER — NALOXONE HYDROCHLORIDE 0.4 MG/ML
0.1 INJECTION, SOLUTION INTRAMUSCULAR; INTRAVENOUS; SUBCUTANEOUS
Status: DISCONTINUED | OUTPATIENT
Start: 2025-02-05 | End: 2025-02-06 | Stop reason: HOSPADM

## 2025-02-05 RX ORDER — ONDANSETRON 2 MG/ML
4 INJECTION INTRAMUSCULAR; INTRAVENOUS EVERY 30 MIN PRN
Status: DISCONTINUED | OUTPATIENT
Start: 2025-02-05 | End: 2025-02-06 | Stop reason: HOSPADM

## 2025-02-05 RX ORDER — PROCHLORPERAZINE MALEATE 10 MG
10 TABLET ORAL EVERY 6 HOURS PRN
Status: DISCONTINUED | OUTPATIENT
Start: 2025-02-05 | End: 2025-02-06 | Stop reason: HOSPADM

## 2025-02-05 RX ORDER — OXYCODONE HYDROCHLORIDE 5 MG/1
5 TABLET ORAL
Status: DISCONTINUED | OUTPATIENT
Start: 2025-02-05 | End: 2025-02-06 | Stop reason: HOSPADM

## 2025-02-05 RX ORDER — NALOXONE HYDROCHLORIDE 0.4 MG/ML
0.4 INJECTION, SOLUTION INTRAMUSCULAR; INTRAVENOUS; SUBCUTANEOUS
Status: DISCONTINUED | OUTPATIENT
Start: 2025-02-05 | End: 2025-02-06 | Stop reason: HOSPADM

## 2025-02-05 RX ORDER — PROPOFOL 10 MG/ML
INJECTION, EMULSION INTRAVENOUS PRN
Status: DISCONTINUED | OUTPATIENT
Start: 2025-02-05 | End: 2025-02-05

## 2025-02-05 RX ORDER — NALOXONE HYDROCHLORIDE 0.4 MG/ML
0.2 INJECTION, SOLUTION INTRAMUSCULAR; INTRAVENOUS; SUBCUTANEOUS
Status: DISCONTINUED | OUTPATIENT
Start: 2025-02-05 | End: 2025-02-06 | Stop reason: HOSPADM

## 2025-02-05 RX ORDER — ONDANSETRON 4 MG/1
4 TABLET, ORALLY DISINTEGRATING ORAL EVERY 6 HOURS PRN
Status: DISCONTINUED | OUTPATIENT
Start: 2025-02-05 | End: 2025-02-06 | Stop reason: HOSPADM

## 2025-02-05 RX ORDER — OXYCODONE HYDROCHLORIDE 5 MG/1
10 TABLET ORAL
Status: DISCONTINUED | OUTPATIENT
Start: 2025-02-05 | End: 2025-02-06 | Stop reason: HOSPADM

## 2025-02-05 RX ORDER — ONDANSETRON 2 MG/ML
4 INJECTION INTRAMUSCULAR; INTRAVENOUS
Status: DISCONTINUED | OUTPATIENT
Start: 2025-02-05 | End: 2025-02-05 | Stop reason: HOSPADM

## 2025-02-05 RX ORDER — SODIUM CHLORIDE, SODIUM LACTATE, POTASSIUM CHLORIDE, CALCIUM CHLORIDE 600; 310; 30; 20 MG/100ML; MG/100ML; MG/100ML; MG/100ML
INJECTION, SOLUTION INTRAVENOUS CONTINUOUS
Status: DISCONTINUED | OUTPATIENT
Start: 2025-02-05 | End: 2025-02-05 | Stop reason: HOSPADM

## 2025-02-05 RX ORDER — PROPOFOL 10 MG/ML
INJECTION, EMULSION INTRAVENOUS CONTINUOUS PRN
Status: DISCONTINUED | OUTPATIENT
Start: 2025-02-05 | End: 2025-02-05

## 2025-02-05 RX ORDER — ACETAMINOPHEN 325 MG/1
975 TABLET ORAL ONCE
Status: DISCONTINUED | OUTPATIENT
Start: 2025-02-05 | End: 2025-02-05 | Stop reason: HOSPADM

## 2025-02-05 RX ORDER — FLUMAZENIL 0.1 MG/ML
0.2 INJECTION, SOLUTION INTRAVENOUS
Status: ACTIVE | OUTPATIENT
Start: 2025-02-05 | End: 2025-02-05

## 2025-02-05 RX ORDER — DEXAMETHASONE SODIUM PHOSPHATE 10 MG/ML
4 INJECTION, SOLUTION INTRAMUSCULAR; INTRAVENOUS
Status: DISCONTINUED | OUTPATIENT
Start: 2025-02-05 | End: 2025-02-06 | Stop reason: HOSPADM

## 2025-02-05 RX ORDER — ONDANSETRON 4 MG/1
4 TABLET, ORALLY DISINTEGRATING ORAL EVERY 30 MIN PRN
Status: DISCONTINUED | OUTPATIENT
Start: 2025-02-05 | End: 2025-02-06 | Stop reason: HOSPADM

## 2025-02-05 RX ADMIN — PROPOFOL 50 MG: 10 INJECTION, EMULSION INTRAVENOUS at 07:56

## 2025-02-05 RX ADMIN — PROPOFOL 200 MCG/KG/MIN: 10 INJECTION, EMULSION INTRAVENOUS at 07:55

## 2025-02-05 RX ADMIN — PROPOFOL 50 MG: 10 INJECTION, EMULSION INTRAVENOUS at 07:54

## 2025-02-05 RX ADMIN — LIDOCAINE HYDROCHLORIDE 60 MG: 20 INJECTION, SOLUTION INFILTRATION; PERINEURAL at 07:54

## 2025-02-05 RX ADMIN — SODIUM CHLORIDE, SODIUM LACTATE, POTASSIUM CHLORIDE, CALCIUM CHLORIDE: 600; 310; 30; 20 INJECTION, SOLUTION INTRAVENOUS at 07:51

## 2025-02-05 NOTE — H&P
Yonis ROBERT Wijoseseth  [id number]  male  44 year old      Reason for procedure/surgery: screening    Patient Active Problem List   Diagnosis    Alopecia    Hip pain, chronic, left    S/P LASIK surgery    Impaired fasting glucose    Anal condyloma       Past Surgical History:    Past Surgical History:   Procedure Laterality Date    ARTHROSCOPY HIP, OSTEOPLASTY FEMUR PROXIMAL, COMBINED  1/14/2013    Procedure: COMBINED ARTHROSCOPY HIP, OSTEOPLASTY FEMUR PROXIMAL;  Left Hip Arthroscopy, Cam Correction, Labral Repair ,;  Surgeon: Stanley Edwards MD;  [facility]    ARTHROSCOPY SHOULDER SUPERIOR LABRUM ANTERIOR TO POSTERIOR REPAIR  12/30/2011    Procedure:ARTHROSCOPY SHOULDER SUPERIOR LABRUM ANTERIOR TO POSTERIOR REPAIR; Right Shoulder Arthroscopy,   Superior Labral Anteroposterior Repair, Distal Clavicle Excision              ; Surgeon:JAI HERRERA; [facility]    EXAM UNDER ANESTHESIA ANUS N/A 10/1/2021    Procedure: EXAM UNDER ANESTHESIA,;  Surgeon: Isra Millan MD;  Location: [facility] OR    FULGURATE CONDYLOMA RECTUM N/A 10/1/2021    Procedure: excision and fulguration of anal condyloma;  Surgeon: Isra Millan MD;  Location: [facility] OR    HERNIA REPAIR, UMBILICAL  1979    LASIK Bilateral March 2011    [facility] SHOULDER SURG PROC UNLISTED         Past Medical History:   Past Medical History:   Diagnosis Date    Anal condyloma     Arthritis Jan 2012    Dyspnea on exertion     Impaired fasting glucose 05/17/2017    Pain in right shoulder     SLAP TEAR    PONV (postoperative nausea and vomiting)        Social History:   Social History     Tobacco Use    Smoking status: Former     Types: Cigarettes     Passive exposure: Never    Smokeless tobacco: Former    Tobacco comments:     social smoker   Substance Use Topics    Alcohol use: Yes     Comment: 5-10 DRINKS PER WEEK       Family History:   Family History   Problem Relation Age of Onset    Allergies Mother     Lipids Father     Abdominal Aortic Aneurysm  Father         had surgery for this 73    Breast Cancer Maternal Grandmother     Cancer Maternal Grandmother     Asthma Maternal Grandfather     Thyroid Disease Paternal Grandmother     Diabetes Paternal Grandmother     Depression Paternal Grandmother     Musculoskeletal Disorder Paternal Grandfather     Heart Disease Paternal Grandfather     C.A.D. Maternal Uncle     Hypertension No family hx of     Cerebrovascular Disease No family hx of     Glaucoma No family hx of     Macular Degeneration No family hx of        Allergies:   Allergies   Allergen Reactions    Amoxicillin Rash       Active Medications:   Current Outpatient Medications   Medication Sig Dispense Refill    finasteride (PROSCAR) 5 MG tablet TAKE 1/4 TABLET BY MOUTH DAILY 23 tablet 4    Fish Oil-Cholecalciferol (FISH OIL + D3 PO) Take by mouth every morning      Multiple Vitamins-Minerals (MULTIVITAMIN ADULT PO) Take by mouth every morning         Systemic Review:   CONSTITUTIONAL: NEGATIVE for fever, chills, change in weight  ENT/MOUTH: NEGATIVE for ear, mouth and throat problems  RESP: NEGATIVE for significant cough or SOB  CV: NEGATIVE for chest pain, palpitations or peripheral edema    Physical Examination:   Vital Signs: There were no vitals taken for this visit.  GENERAL: healthy, alert and no distress  NECK: no adenopathy, no asymmetry, masses, or scars  RESP: lungs clear to auscultation - no rales, rhonchi or wheezes  CV: regular rate and rhythm, normal S1 S2, no S3 or S4, no murmur, click or rub, no peripheral edema and peripheral pulses strong  ABDOMEN: soft, nontender, no hepatosplenomegaly, no masses and bowel sounds normal  MS: no gross musculoskeletal defects noted, no edema    Plan: Appropriate to proceed as scheduled.      Laine Escobar MD  2/4/2025    PCP:  Leo Sutton

## 2025-02-05 NOTE — ANESTHESIA CARE TRANSFER NOTE
Patient: Yonis Mcnair    Procedure: Procedure(s):  COLONOSCOPY, WITH POLYPECTOMY       Diagnosis: Screen for colon cancer [Z12.11]  Diagnosis Additional Information: No value filed.    Anesthesia Type:   MAC     Note:    Oropharynx: oropharynx clear of all foreign objects  Level of Consciousness: awake  Oxygen Supplementation: room air    Independent Airway: airway patency satisfactory and stable  Dentition: dentition unchanged  Vital Signs Stable: post-procedure vital signs reviewed and stable  Report to RN Given: handoff report given  Patient transferred to: Phase II    Handoff Report: Identifed the Patient, Identified the Reponsible Provider, Reviewed the pertinent medical history, Discussed the surgical course, Reviewed Intra-OP anesthesia mangement and issues during anesthesia, Set expectations for post-procedure period and Allowed opportunity for questions and acknowledgement of understanding      Vitals:  Vitals Value Taken Time   BP     Temp     Pulse     Resp     SpO2         Electronically Signed By: GERARDO Smith CRNA  February 5, 2025  8:26 AM

## 2025-02-05 NOTE — ANESTHESIA PREPROCEDURE EVALUATION
Anesthesia Pre-Procedure Evaluation    Patient: Yonis Mcnair   MRN: [id number] : 1980        Procedure : Procedure(s):  Colonoscopy          Past Medical History:   Diagnosis Date    Anal condyloma     Arthritis 2012    Dyspnea on exertion     Impaired fasting glucose 2017    Pain in right shoulder     SLAP TEAR    PONV (postoperative nausea and vomiting)       Past Surgical History:   Procedure Laterality Date    ARTHROSCOPY HIP, OSTEOPLASTY FEMUR PROXIMAL, COMBINED  2013    Procedure: COMBINED ARTHROSCOPY HIP, OSTEOPLASTY FEMUR PROXIMAL;  Left Hip Arthroscopy, Cam Correction, Labral Repair ,;  Surgeon: Stanley Edwards MD;  [facility]    ARTHROSCOPY SHOULDER SUPERIOR LABRUM ANTERIOR TO POSTERIOR REPAIR  2011    Procedure:ARTHROSCOPY SHOULDER SUPERIOR LABRUM ANTERIOR TO POSTERIOR REPAIR; Right Shoulder Arthroscopy,   Superior Labral Anteroposterior Repair, Distal Clavicle Excision              ; Surgeon:JAI HERRERA; [facility]    EXAM UNDER ANESTHESIA ANUS N/A 10/1/2021    Procedure: EXAM UNDER ANESTHESIA,;  Surgeon: Isra Millan MD;  Location: [facility] OR    FULGURATE CONDYLOMA RECTUM N/A 10/1/2021    Procedure: excision and fulguration of anal condyloma;  Surgeon: Isra Millan MD;  Location: [facility] OR    HERNIA REPAIR, UMBILICAL      LASIK Bilateral 2011    [facility] SHOULDER SURG PROC UNLISTED        Allergies   Allergen Reactions    Amoxicillin Rash      Social History     Tobacco Use    Smoking status: Former     Types: Cigarettes     Passive exposure: Never    Smokeless tobacco: Former    Tobacco comments:     social smoker   Substance Use Topics    Alcohol use: Yes     Comment: 5-10 DRINKS PER WEEK      Wt Readings from Last 1 Encounters:   25 88.5 kg (195 lb)           Physical Exam    Airway        Mallampati: I   TM distance: > 3 FB   Neck ROM: full   Mouth opening: > 3 cm    Respiratory Devices and Support         Dental       (+) Minor  "Abnormalities - some fillings, tiny chips      Cardiovascular   cardiovascular exam normal          Pulmonary   pulmonary exam normal                OUTSIDE LABS:  CBC:   Lab Results   Component Value Date    WBC 5.0 10/02/2024    WBC 4.9 09/22/2021    HGB 16.1 10/02/2024    HGB 16.3 09/22/2021    HCT 45.7 10/02/2024    HCT 45.7 09/22/2021     10/02/2024     09/22/2021     BMP:   Lab Results   Component Value Date     10/02/2024     09/20/2023    POTASSIUM 5.0 10/02/2024    POTASSIUM 4.7 09/20/2023    CHLORIDE 101 10/02/2024    CHLORIDE 102 09/20/2023    CO2 25 10/02/2024    CO2 26 09/20/2023    BUN 18.8 10/02/2024    BUN 20.2 (H) 09/20/2023    CR 1.17 10/02/2024    CR 1.06 09/20/2023     (H) 10/02/2024     (H) 09/20/2023     COAGS: No results found for: \"PTT\", \"INR\", \"FIBR\"  POC: No results found for: \"BGM\", \"HCG\", \"HCGS\"  HEPATIC:   Lab Results   Component Value Date    ALBUMIN 4.6 10/02/2024    PROTTOTAL 7.7 10/02/2024    ALT 50 10/02/2024    AST 35 10/02/2024    ALKPHOS 77 10/02/2024    BILITOTAL 0.9 10/02/2024     OTHER:   Lab Results   Component Value Date    A1C 5.6 10/02/2024    ENA 9.9 10/02/2024    TSH 1.15 10/02/2024       Anesthesia Plan    ASA Status:  2    NPO Status:  NPO Appropriate    Anesthesia Type: MAC.     - Reason for MAC: straight local not clinically adequate   Induction: Intravenous, Propofol.   Maintenance: TIVA.        Consents    Anesthesia Plan(s) and associated risks, benefits, and realistic alternatives discussed. Questions answered and patient/representative(s) expressed understanding.     - Discussed: Risks, Benefits and Alternatives for BOTH SEDATION and the PROCEDURE were discussed     - Discussed with:  Patient      - Extended Intubation/Ventilatory Support Discussed: No.      - Patient is DNR/DNI Status: No     Use of blood products discussed: No .     Postoperative Care       PONV prophylaxis: Ondansetron (or other 5HT-3), Background " Propofol Infusion     Comments:               Lionel Reinoso MD    I have reviewed the pertinent notes and labs in the chart from the past 30 days and (re)examined the patient.  Any updates or changes from those notes are reflected in this note.    Clinically Significant Risk Factors Present on Admission                             # Overweight: Estimated body mass index is 26.45 kg/m  as calculated from the following:    Height as of this encounter: 1.829 m (6').    Weight as of this encounter: 88.5 kg (195 lb).

## 2025-02-05 NOTE — ANESTHESIA POSTPROCEDURE EVALUATION
Patient: Yonis Mcnair    Procedure: Procedure(s):  COLONOSCOPY, WITH POLYPECTOMY       Anesthesia Type:  MAC    Note:  Disposition: Outpatient   Postop Pain Control: Uneventful            Sign Out: Well controlled pain   PONV: No   Neuro/Psych: Uneventful            Sign Out: Acceptable/Baseline neuro status   Airway/Respiratory: Uneventful            Sign Out: Acceptable/Baseline resp. status   CV/Hemodynamics: Uneventful            Sign Out: Acceptable CV status; No obvious hypovolemia; No obvious fluid overload   Other NRE:    DID A NON-ROUTINE EVENT OCCUR?            Last vitals:  Vitals Value Taken Time   /76 02/05/25 0839   Temp 36  C (96.8  F) 02/05/25 0839   Pulse 81 02/05/25 0839   Resp 15 02/05/25 0839   SpO2 99 % 02/05/25 0841   Vitals shown include unfiled device data.    Electronically Signed By: Lionel Reinoso MD  February 5, 2025  10:12 AM

## 2025-02-06 LAB
PATH REPORT.COMMENTS IMP SPEC: NORMAL
PATH REPORT.COMMENTS IMP SPEC: NORMAL
PATH REPORT.FINAL DX SPEC: NORMAL
PATH REPORT.GROSS SPEC: NORMAL
PATH REPORT.MICROSCOPIC SPEC OTHER STN: NORMAL
PATH REPORT.RELEVANT HX SPEC: NORMAL
PHOTO IMAGE: NORMAL

## 2025-04-02 ENCOUNTER — OFFICE VISIT (OUTPATIENT)
Dept: SURGERY | Facility: CLINIC | Age: 45
End: 2025-04-02
Payer: COMMERCIAL

## 2025-04-02 VITALS
HEIGHT: 72 IN | SYSTOLIC BLOOD PRESSURE: 130 MMHG | OXYGEN SATURATION: 98 % | DIASTOLIC BLOOD PRESSURE: 80 MMHG | HEART RATE: 78 BPM | BODY MASS INDEX: 25.86 KG/M2 | WEIGHT: 190.9 LBS

## 2025-04-02 DIAGNOSIS — A63.0 ANAL CONDYLOMA: Primary | ICD-10-CM

## 2025-04-02 PROCEDURE — 46600 DIAGNOSTIC ANOSCOPY SPX: CPT | Performed by: NURSE PRACTITIONER

## 2025-04-02 PROCEDURE — G0452 MOLECULAR PATHOLOGY INTERPR: HCPCS | Mod: 26 | Performed by: PATHOLOGY

## 2025-04-02 PROCEDURE — 99213 OFFICE O/P EST LOW 20 MIN: CPT | Mod: 25 | Performed by: NURSE PRACTITIONER

## 2025-04-02 PROCEDURE — 1126F AMNT PAIN NOTED NONE PRSNT: CPT | Performed by: NURSE PRACTITIONER

## 2025-04-02 PROCEDURE — 3079F DIAST BP 80-89 MM HG: CPT | Performed by: NURSE PRACTITIONER

## 2025-04-02 PROCEDURE — 88112 CYTOPATH CELL ENHANCE TECH: CPT | Mod: 26 | Performed by: PATHOLOGY

## 2025-04-02 PROCEDURE — 88112 CYTOPATH CELL ENHANCE TECH: CPT | Mod: TC | Performed by: NURSE PRACTITIONER

## 2025-04-02 PROCEDURE — 3075F SYST BP GE 130 - 139MM HG: CPT | Performed by: NURSE PRACTITIONER

## 2025-04-02 PROCEDURE — 87624 HPV HI-RISK TYP POOLED RSLT: CPT | Performed by: NURSE PRACTITIONER

## 2025-04-02 ASSESSMENT — PAIN SCALES - GENERAL: PAINLEVEL_OUTOF10: NO PAIN (0)

## 2025-04-02 NOTE — LETTER
2025       RE: Yonis Mcnair  [address]  [facility] [address]     Dear Colleague,    Thank you for referring your patient, Yonis Mcnair, to the [facility] [place] [facility]. Please see a copy of my visit note below.    Colon and Rectal Surgery Follow-Up Clinic Note    RE: Yonis Mcnair  : 1980  MALIK: 2025    Yonis Mcnair is a very pleasant 44 year old male here for follow-up of anal condyloma. He is status post evaluation under anesthesia with fulguration of anal condyloma with Dr. Millan in 10/2021 and again with Dr. Pacheco on 22. I saw him last in clinic 6 months ago with no recurrent warts.    Interval history: Yonis has been doing well. He returns today for a wart check.   He is a former smoker. He has had anal receptive intercourse in the remote past but only a few times.    Physical Examination: Exam was chaperoned by OLEG Escalona   /80 (BP Location: Left arm, Patient Position: Sitting, Cuff Size: Adult Regular)   Pulse 78   Ht 6'   Wt 190 lb 14.4 oz   SpO2 98%   BMI 25.89 kg/m    General: alert, oriented, in no acute distress, sitting comfortably  HEENT: moist mucous membranes    Perianal external examination:  Perianal skin: Intact with no excoriation or lichenification.  Lesions: No evidence of an external lesion, nodularity, or induration in the perianal region.  Eversion of buttocks: There was not evidence of an anal fissure. Details: N/A.  Skin tags or external hemorrhoids: None.    Digital rectal examination: Was performed.   Sphincter tone: Good.  Palpable lesions: None  Prostate: Normal.  Other: None.    Anoscopy: Was performed.   Hemorrhoids: No significant internal hemorrhoids.    Assessment/Plan: 44 year old male with history of anal condyloma. No condyloma on exam today. Recheck in one year. Anal Pap given history of anal intercourse. Will  follow up with this and the results of his HPV genotyping for follow up plan. Patient's questions were answered to his stated satisfaction and he is in agreement with this plan.     Medical history:  Past Medical History:   Diagnosis Date     Anal condyloma      Arthritis Jan 2012     Dyspnea on exertion      Impaired fasting glucose 05/17/2017     Pain in right shoulder     SLAP TEAR     PONV (postoperative nausea and vomiting)        Surgical history:  Past Surgical History:   Procedure Laterality Date     ARTHROSCOPY HIP, OSTEOPLASTY FEMUR PROXIMAL, COMBINED  1/14/2013    Procedure: COMBINED ARTHROSCOPY HIP, OSTEOPLASTY FEMUR PROXIMAL;  Left Hip Arthroscopy, Cam Correction, Labral Repair ,;  Surgeon: Stanley Edwards MD;  [facility]     ARTHROSCOPY SHOULDER SUPERIOR LABRUM ANTERIOR TO POSTERIOR REPAIR  12/30/2011    Procedure:ARTHROSCOPY SHOULDER SUPERIOR LABRUM ANTERIOR TO POSTERIOR REPAIR; Right Shoulder Arthroscopy,   Superior Labral Anteroposterior Repair, Distal Clavicle Excision              ; Surgeon:JAI HERRERA; [facility]     COLONOSCOPY N/A 2/5/2025    Procedure: COLONOSCOPY, WITH POLYPECTOMY;  Surgeon: Laine Escobar MD;  Location: [facility] OR     EXAM UNDER ANESTHESIA ANUS N/A 10/1/2021    Procedure: EXAM UNDER ANESTHESIA,;  Surgeon: Isra Millan MD;  Location: [facility] OR     FULGURATE CONDYLOMA RECTUM N/A 10/1/2021    Procedure: excision and fulguration of anal condyloma;  Surgeon: Isra Millan MD;  Location: [facility] OR     HERNIA REPAIR, UMBILICAL  1979     LASIK Bilateral March 2011     [facility] SHOULDER SURG PROC UNLISTED         Problem list:      Patient Active Problem List    Diagnosis Date Noted     Anal condyloma 08/04/2021     Priority: Medium     Added automatically from request for surgery [id number]       Impaired fasting glucose 05/17/2017     Priority: Medium     S/P LASIK surgery 04/27/2016     Priority: Medium     Hip pain, chronic, left 10/07/2015     Priority: Medium      Alopecia 03/11/2015     Priority: Medium     Problem list name updated by automated process. Provider to review         Medications:  Current Outpatient Medications   Medication Sig Dispense Refill     finasteride (PROSCAR) 5 MG tablet TAKE 1/4 TABLET BY MOUTH DAILY 23 tablet 4     Fish Oil-Cholecalciferol (FISH OIL + D3 PO) Take by mouth every morning       Multiple Vitamins-Minerals (MULTIVITAMIN ADULT PO) Take by mouth every morning         Allergies:  Allergies   Allergen Reactions     Amoxicillin Rash       Family history:  Family History   Problem Relation Age of Onset     Allergies Mother      Lipids Father      Abdominal Aortic Aneurysm Father         had surgery for this 73     Breast Cancer Maternal Grandmother      Cancer Maternal Grandmother      Asthma Maternal Grandfather      Thyroid Disease Paternal Grandmother      Diabetes Paternal Grandmother      Depression Paternal Grandmother      Musculoskeletal Disorder Paternal Grandfather      Heart Disease Paternal Grandfather      C.A.D. Maternal Uncle      Hypertension No family hx of      Cerebrovascular Disease No family hx of      Glaucoma No family hx of      Macular Degeneration No family hx of        Social history:  Social History     Tobacco Use     Smoking status: Former     Types: Cigarettes     Passive exposure: Never     Smokeless tobacco: Former     Tobacco comments:     social smoker   Substance Use Topics     Alcohol use: Yes     Comment: 5-10 DRINKS PER WEEK     Marital status: .  Occupation: physical therapist.    Nursing Notes:   Fatemeh Menchaca  4/2/2025 10:44 AM  Signed  Chief Complaint   Patient presents with     Follow Up     Wart check       Vitals:    04/02/25 1042   BP: 130/80   BP Location: Left arm   Patient Position: Sitting   Cuff Size: Adult Regular   Pulse: 78   SpO2: 98%   Weight: 190 lb 14.4 oz   Height: 6'       Body mass index is 25.89 kg/m .    OLEG Escalona     10 minutes spent on the date of  encounterperforming chart review, history and exam, documentation and further activities as noted above with an additional 2 minutes for anoscopy    ALYSSA Villanueva  Colon and Rectal Surgery  [facility]      Again, thank you for allowing me to participate in the care of your patient.      Sincerely,    GERARDO Villanueva CNP

## 2025-04-02 NOTE — PROGRESS NOTES
Colon and Rectal Surgery Follow-Up Clinic Note    RE: Yonis Mcnair  : 1980  MALIK: 2025    Yonis Mcnair is a very pleasant 44 year old male here for follow-up of anal condyloma. He is status post evaluation under anesthesia with fulguration of anal condyloma with Dr. Millan in 10/2021 and again with Dr. Pachceo on 22. I saw him last in clinic 6 months ago with no recurrent warts.    Interval history: Yonis has been doing well. He returns today for a wart check.   He is a former smoker. He has had anal receptive intercourse in the remote past but only a few times.    Physical Examination: Exam was chaperoned by OLEG Escalona   /80 (BP Location: Left arm, Patient Position: Sitting, Cuff Size: Adult Regular)   Pulse 78   Ht 6'   Wt 190 lb 14.4 oz   SpO2 98%   BMI 25.89 kg/m    General: alert, oriented, in no acute distress, sitting comfortably  HEENT: moist mucous membranes    Perianal external examination:  Perianal skin: Intact with no excoriation or lichenification.  Lesions: No evidence of an external lesion, nodularity, or induration in the perianal region.  Eversion of buttocks: There was not evidence of an anal fissure. Details: N/A.  Skin tags or external hemorrhoids: None.    Digital rectal examination: Was performed.   Sphincter tone: Good.  Palpable lesions: None  Prostate: Normal.  Other: None.    Anoscopy: Was performed.   Hemorrhoids: No significant internal hemorrhoids.    Assessment/Plan: 44 year old male with history of anal condyloma. No condyloma on exam today. Recheck in one year. Anal Pap given history of anal intercourse. Will follow up with this and the results of his HPV genotyping for follow up plan. Patient's questions were answered to his stated satisfaction and he is in agreement with this plan.     Medical history:  Past Medical History:   Diagnosis Date    Anal condyloma     Arthritis 2012    Dyspnea on exertion     Impaired fasting glucose  05/17/2017    Pain in right shoulder     SLAP TEAR    PONV (postoperative nausea and vomiting)        Surgical history:  Past Surgical History:   Procedure Laterality Date    ARTHROSCOPY HIP, OSTEOPLASTY FEMUR PROXIMAL, COMBINED  1/14/2013    Procedure: COMBINED ARTHROSCOPY HIP, OSTEOPLASTY FEMUR PROXIMAL;  Left Hip Arthroscopy, Cam Correction, Labral Repair ,;  Surgeon: Stanley Edwards MD;  [facility]    ARTHROSCOPY SHOULDER SUPERIOR LABRUM ANTERIOR TO POSTERIOR REPAIR  12/30/2011    Procedure:ARTHROSCOPY SHOULDER SUPERIOR LABRUM ANTERIOR TO POSTERIOR REPAIR; Right Shoulder Arthroscopy,   Superior Labral Anteroposterior Repair, Distal Clavicle Excision              ; Surgeon:JAI HERRERA; Location:[facility]    COLONOSCOPY N/A 2/5/2025    Procedure: COLONOSCOPY, WITH POLYPECTOMY;  Surgeon: Laine Escobar MD;  Location: [facility] OR    EXAM UNDER ANESTHESIA ANUS N/A 10/1/2021    Procedure: EXAM UNDER ANESTHESIA,;  Surgeon: Isra Millan MD;  Location: [facility] OR    FULGURATE CONDYLOMA RECTUM N/A 10/1/2021    Procedure: excision and fulguration of anal condyloma;  Surgeon: Isra Millan MD;  Location: [facility] OR    HERNIA REPAIR, UMBILICAL  1979    LASIK Bilateral March 2011    [facility] SHOULDER SURG PROC UNLISTED         Problem list:      Patient Active Problem List    Diagnosis Date Noted    Anal condyloma 08/04/2021     Priority: Medium     Added automatically from request for surgery [id number]      Impaired fasting glucose 05/17/2017     Priority: Medium    S/P LASIK surgery 04/27/2016     Priority: Medium    Hip pain, chronic, left 10/07/2015     Priority: Medium    Alopecia 03/11/2015     Priority: Medium     Problem list name updated by automated process. Provider to review         Medications:  Current Outpatient Medications   Medication Sig Dispense Refill    finasteride (PROSCAR) 5 MG tablet TAKE 1/4 TABLET BY MOUTH DAILY 23 tablet 4    Fish Oil-Cholecalciferol (FISH OIL + D3 PO) Take by mouth every  morning      Multiple Vitamins-Minerals (MULTIVITAMIN ADULT PO) Take by mouth every morning         Allergies:  Allergies   Allergen Reactions    Amoxicillin Rash       Family history:  Family History   Problem Relation Age of Onset    Allergies Mother     Lipids Father     Abdominal Aortic Aneurysm Father         had surgery for this 73    Breast Cancer Maternal Grandmother     Cancer Maternal Grandmother     Asthma Maternal Grandfather     Thyroid Disease Paternal Grandmother     Diabetes Paternal Grandmother     Depression Paternal Grandmother     Musculoskeletal Disorder Paternal Grandfather     Heart Disease Paternal Grandfather     C.A.D. Maternal Uncle     Hypertension No family hx of     Cerebrovascular Disease No family hx of     Glaucoma No family hx of     Macular Degeneration No family hx of        Social history:  Social History     Tobacco Use    Smoking status: Former     Types: Cigarettes     Passive exposure: Never    Smokeless tobacco: Former    Tobacco comments:     social smoker   Substance Use Topics    Alcohol use: Yes     Comment: 5-10 DRINKS PER WEEK     Marital status: .  Occupation: physical therapist.    Nursing Notes:   Fatemeh Menchaca  4/2/2025 10:44 AM  Signed  Chief Complaint   Patient presents with    Follow Up     Wart check       Vitals:    04/02/25 1042   BP: 130/80   BP Location: Left arm   Patient Position: Sitting   Cuff Size: Adult Regular   Pulse: 78   SpO2: 98%   Weight: 190 lb 14.4 oz   Height: 6'       Body mass index is 25.89 kg/m .    OLEG Escalona     10 minutes spent on the date of encounterperforming chart review, history and exam, documentation and further activities as noted above with an additional 2 minutes for anoscopy    JAMAL VillanuevaC  Colon and Rectal Surgery  [facility]

## 2025-04-02 NOTE — NURSING NOTE
Chief Complaint   Patient presents with    Follow Up     Wart check       Vitals:    04/02/25 1042   BP: 130/80   BP Location: Left arm   Patient Position: Sitting   Cuff Size: Adult Regular   Pulse: 78   SpO2: 98%   Weight: 190 lb 14.4 oz   Height: 6'       Body mass index is 25.89 kg/m .    Fatemeh Menchaca, EMT

## 2025-05-07 ENCOUNTER — OFFICE VISIT (OUTPATIENT)
Dept: FAMILY MEDICINE | Facility: CLINIC | Age: 45
End: 2025-05-07
Payer: COMMERCIAL

## 2025-05-07 VITALS
OXYGEN SATURATION: 97 % | BODY MASS INDEX: 26.07 KG/M2 | HEIGHT: 72 IN | DIASTOLIC BLOOD PRESSURE: 75 MMHG | SYSTOLIC BLOOD PRESSURE: 124 MMHG | RESPIRATION RATE: 16 BRPM | TEMPERATURE: 97.7 F | HEART RATE: 78 BPM | WEIGHT: 192.5 LBS

## 2025-05-07 DIAGNOSIS — Z71.84 TRAVEL ADVICE ENCOUNTER: Primary | ICD-10-CM

## 2025-05-07 PROCEDURE — 90717 YELLOW FEVER VACCINE SUBQ: CPT | Performed by: NURSE PRACTITIONER

## 2025-05-07 PROCEDURE — 90691 TYPHOID VACCINE IM: CPT | Performed by: NURSE PRACTITIONER

## 2025-05-07 PROCEDURE — 90471 IMMUNIZATION ADMIN: CPT | Performed by: NURSE PRACTITIONER

## 2025-05-07 PROCEDURE — 90632 HEPA VACCINE ADULT IM: CPT | Performed by: NURSE PRACTITIONER

## 2025-05-07 PROCEDURE — 3074F SYST BP LT 130 MM HG: CPT | Performed by: NURSE PRACTITIONER

## 2025-05-07 PROCEDURE — 1126F AMNT PAIN NOTED NONE PRSNT: CPT | Performed by: NURSE PRACTITIONER

## 2025-05-07 PROCEDURE — 99402 PREV MED CNSL INDIV APPRX 30: CPT | Mod: 25 | Performed by: NURSE PRACTITIONER

## 2025-05-07 PROCEDURE — 90656 IIV3 VACC NO PRSV 0.5 ML IM: CPT | Performed by: NURSE PRACTITIONER

## 2025-05-07 PROCEDURE — 3078F DIAST BP <80 MM HG: CPT | Performed by: NURSE PRACTITIONER

## 2025-05-07 PROCEDURE — 90472 IMMUNIZATION ADMIN EACH ADD: CPT | Performed by: NURSE PRACTITIONER

## 2025-05-07 RX ORDER — AZITHROMYCIN 500 MG/1
500 TABLET, FILM COATED ORAL DAILY
Qty: 3 TABLET | Refills: 0 | Status: SHIPPED | OUTPATIENT
Start: 2025-05-07 | End: 2025-05-10

## 2025-05-07 ASSESSMENT — PAIN SCALES - GENERAL: PAINLEVEL_OUTOF10: NO PAIN (0)

## 2025-05-07 NOTE — PATIENT INSTRUCTIONS
Thank you for visiting the [facility] : [phone]  Today May 7, 2025 you received the    Flu Vaccine    Hepatitis A Vaccine - Please return on 11/3/25 or later for your 2nd and final dose.    Yellow Fever (YF)    Typhoid - injectable. This vaccine is valid for two years.     Follow up vaccine appointments can be made as a NURSE ONLY visit at the Travel Clinic, (BE PREPARED TO WAIT, ) or at designated [facility].    If you are receiving the Rabies vaccines series, it is important that you follow the exact schedule ordered.     Pre-travel     We recommend that you purchase Medical Evacuation Insurance prior to your departure.  Https://wwwnc.cdc.gov/travel/page/insurance    [place] your travel plans with the [employer] Department of State through STEP ( Smart Traveler Enrollment Program ) https://step.state.gov.  STEP is a free service to allow [place] citizens and nationals traveling and living abroad to enroll their trip with the nearest [place] Embassy or Consulate.    Animal Exposure: Avoid all mammals even if they look healthy.  If there is a bite, scratch or even a lick, wash area immediately with soap and water for 15 minutes and seek medical care within 24 hours for evaluation of Rabies post exposure treatment.  Contact your Medical Evacuation Insurance.    Repiratory illness prevention strategies ( Covid and Influenza ) CDC recommendations:  Consider wearing a mask in crowded or poorly ventilated indoor areas, including on public transportation and in transportation hubs.  Hand washing: frequent, thorough handwashing with soap and water for 20 seconds. Use an alcohol-based hand  with at least 60% alcohol if soap and water are not readily available. Avoid touching face, nose, eyes, mouth unless you have done appropriate hand washing as above.  VACCINES: Staying up to date on COVID-19 vaccines significantly lowers the risk of getting very sick, being hospitalized,  or dying from COVID-19. CDC recommends that everyone stay up to date on their COVID-19 vaccines, especially people with weakened immune systems.    Travel Covid 19 Testing:  updated 12/06/2021  International travelers: Pre-travel:  See country specific Embassy websites or airline websites.    Post travel: CDC recommends getting tested 3-5 days after your trip     Post-travel illness:  Contact your provider or [facility] if you develop a fever, rash, cough, diarrhea or other symptoms for up to 1 year after travel.  Inform your healthcare provider when and where you traveled to.    Please call the [facility] with any questions [phone]  Or send your provider a 'My Chart' note.

## 2025-05-07 NOTE — PROGRESS NOTES
"Nurse Note ( Pre-Travel Consult)    Itinerary:  [place], [place]    Departure Date: 07/10    Return Date: 07/20    Length of Trip 10 days    Reason for Travel: Tourism         Urban or rural: both    Accommodations: Hotel        IMMUNIZATION HISTORY  Have you received any immunizations within the past 4 weeks?  No  Have you ever fainted from having your blood drawn or from an injection?  No  Have you ever had a fever reaction to vaccination?  No  Have you ever had any bad reaction or side effect from any vaccination?  Yes  Do you live (or work closely) with anyone who has AIDS, an AIDS-like condition, any other immune disorder or who is on chemotherapy for cancer?  No  Do you have a family history of immunodeficiency?  No  Have you received any injection of immune globulin or any blood products during the past 12 months?  No    Patient roomed by Lionel Mcnair is a 44 year old male seen today alone for counsultation for international travel.   Patient will be departing in  2 month(s) and  traveling with partner.      Patient itinerary :  will be in the [facility] [place]  region of [place] and [place] which risk for Yellow Fever, Dengue Fever, Chikungungya, food borne illnesses, motor vehicle accidents, and Typhoid. exposure.      Patient's activities will include sightseeing.    Patient[place] birth [place]     Special medical concerns: none  Pre-travel questionnaire was completed by patient and reviewed by provider.     Vitals: /75   Pulse 78   Temp 97.7  F (36.5  C) (Temporal)   Resp 16   Ht 1.822 m (5' 11.75\")   Wt 87.3 kg (192 lb 8 oz)   SpO2 97%   BMI 26.29 kg/m    BMI= Body mass index is 26.29 kg/m .    EXAM:  General:  Well-nourished, well-developed in no acute distress.  Appears to be stated age, interacts appropriately and expresses understanding of information given to patient.    Current Outpatient Medications   Medication Sig Dispense " Refill    finasteride (PROSCAR) 5 MG tablet TAKE 1/4 TABLET BY MOUTH DAILY 23 tablet 4    Fish Oil-Cholecalciferol (FISH OIL + D3 PO) Take by mouth every morning      Multiple Vitamins-Minerals (MULTIVITAMIN ADULT PO) Take by mouth every morning       Patient Active Problem List   Diagnosis    Alopecia    Hip pain, chronic, left    S/P LASIK surgery    Impaired fasting glucose    Anal condyloma     Allergies   Allergen Reactions    Amoxicillin Rash         Immunizations discussed include:   Covid 19: Up to date  Hepatitis A:  Ordered/given today, risks, benefits and side effects reviewed  Hepatitis B: Up to date  Influenza: Ordered/given today, risks, benefits and side effects reviewed  Typhoid: Ordered/given today, risks, benefits and side effects reviewed  Rabies: Declined  reviewed managment of a animal bite or scratch (washing wound, seek medical care within 24 hours for post exposure prophylaxis )  Yellow Fever: Yellow Fever ordered/given today - side effects, precautions, allergies, risks discussed. Patient expressed understanding.  Y[place]i Encephalitis: Not indicated  Meningococcus: Not indicated  Tetanus/Diphtheria: Up to date  Measles/Mumps/Rubella: Up to date  Cholera: Not needed  Polio: Not indicated  Pneumococcal: Under age of 65  Varicella: Immune by disease history per patient report  Shingrix: Not indicated  HPV:  Up to date   Chikunguya: future order in no less than 1 month   Mpox:  Declined  Not concerned about risk of disease     TB: low risk     Altitude Exposure on this trip: no  Past tolerance to Altitude: na    ASSESSMENT/PLAN:  Yonis was seen today for travel clinic.    Diagnoses and all orders for this visit:    Travel advice encounter  -     azithromycin (ZITHROMAX) 500 MG tablet; Take 1 tablet (500 mg) by mouth daily for 3 doses. Take 1 tablet a day for up to 3 days for severe diarrhea    Other orders  -     YELLOW FEVER, LIVE SQ  -     TYPHOID VACCINE, IM  -     INFLUENZA VACCINE, SPLIT  VIRUS, TRIVALENT,PF (FLUZONE\FLUARIX)  -     HEPATITIS A 19+ (HAVRIX/VAQTA)  -     IXCHIQ (CHIKUNGUNYA VIRUS) LIVE VACCINE; Standing      I have reviewed general recommendations for safe travel   including: food/water precautions, insect precautions, safer sex   practices given high prevalence of Zika, HIV, MPox and other STDs,   roadway safety. Educational materials and [employer] report provided.    [place]araia prophylaxis recommended: not needed  Symptomatic treatment for traveler's diarrhea: azithromycin        Evacuation insurance advised and resources were provided to patient.    Total visit time 33 minutes  with over 50% of time spent counseling patient and shared decision making as detailed above.    Emilia Laureano CNP  Certificate in Travel Health

## (undated) DEVICE — GOWN IMPERVIOUS 2XL BLUE

## (undated) DEVICE — DRSG GAUZE 4X4" TRAY 6939

## (undated) DEVICE — PACK RECTAL KIT CUSTOM ASC

## (undated) DEVICE — ENDO SNARE EXACTO COLD 9MM LOOP 2.4MMX230CM 00711115

## (undated) DEVICE — SUCTION MANIFOLD NEPTUNE 2 SYS 1 PORT 702-025-000

## (undated) DEVICE — MANIFOLD NEPTUNE 4 PORT 700-20

## (undated) DEVICE — KIT ENDO TURNOVER/PROCEDURE CARRY-ON 101822

## (undated) DEVICE — PAD CHUX UNDERPAD 30X30"

## (undated) DEVICE — SOL WATER IRRIG 500ML BOTTLE 2F7113

## (undated) DEVICE — GOWN IMPERVIOUS BREATHABLE SMART XLG 89045

## (undated) DEVICE — ESU PENCIL SMOKE EVAC W/ROCKER SWITCH 0703-047-000

## (undated) DEVICE — PACK RECTAL UMMC

## (undated) DEVICE — GLOVE PROTEXIS W/NEU-THERA 7.0  2D73TE70

## (undated) DEVICE — KIT ENDO FIRST STEP DISINFECTANT 200ML W/POUCH EP-4

## (undated) DEVICE — RETR RING LONE STAR 14.1X14.1CM 3307G

## (undated) DEVICE — TUBING SUCTION MEDI-VAC 1/4"X20' N620A

## (undated) DEVICE — DRAPE SHEET MED 44X70" 9355

## (undated) DEVICE — SPECIMEN CONTAINER 3OZ W/FORMALIN 59901

## (undated) DEVICE — TAPE DURAPORE 3" SILK 1538-3

## (undated) DEVICE — LINEN TOWEL PACK X5 5464

## (undated) DEVICE — ENDO TRAP POLYP E-TRAP 00711099

## (undated) DEVICE — SPONGE BALL KERLIX ROUND XL W/O STRING LATEX 4935

## (undated) DEVICE — DRAPE LAP PEDS DISP 29492

## (undated) DEVICE — PREP POVIDONE IODINE SOLUTION 10% 4OZ BOTTLE 29906-004

## (undated) DEVICE — PANTIES MESH LG/XLG 2PK 706M2

## (undated) DEVICE — FCP BIOPSY RADIAL JAW 4 JUMBO 3.2MM CHANNEL M00513370

## (undated) RX ORDER — LIDOCAINE HYDROCHLORIDE 20 MG/ML
INJECTION, SOLUTION EPIDURAL; INFILTRATION; INTRACAUDAL; PERINEURAL
Status: DISPENSED
Start: 2021-10-01

## (undated) RX ORDER — GLYCOPYRROLATE 0.2 MG/ML
INJECTION, SOLUTION INTRAMUSCULAR; INTRAVENOUS
Status: DISPENSED
Start: 2021-10-01

## (undated) RX ORDER — LIDOCAINE HYDROCHLORIDE AND EPINEPHRINE 10; 10 MG/ML; UG/ML
INJECTION, SOLUTION INFILTRATION; PERINEURAL
Status: DISPENSED
Start: 2021-10-01

## (undated) RX ORDER — FERRIC SUBSULFATE 0.21 G/G
LIQUID TOPICAL
Status: DISPENSED
Start: 2023-07-19

## (undated) RX ORDER — GABAPENTIN 300 MG/1
CAPSULE ORAL
Status: DISPENSED
Start: 2021-10-01

## (undated) RX ORDER — BUPIVACAINE HYDROCHLORIDE 5 MG/ML
INJECTION, SOLUTION EPIDURAL; INTRACAUDAL
Status: DISPENSED
Start: 2021-10-01

## (undated) RX ORDER — ONDANSETRON 2 MG/ML
INJECTION INTRAMUSCULAR; INTRAVENOUS
Status: DISPENSED
Start: 2021-10-01

## (undated) RX ORDER — PROPOFOL 10 MG/ML
INJECTION, EMULSION INTRAVENOUS
Status: DISPENSED
Start: 2021-10-01

## (undated) RX ORDER — KETOROLAC TROMETHAMINE 30 MG/ML
INJECTION, SOLUTION INTRAMUSCULAR; INTRAVENOUS
Status: DISPENSED
Start: 2021-10-01

## (undated) RX ORDER — ACETAMINOPHEN 325 MG/1
TABLET ORAL
Status: DISPENSED
Start: 2021-10-01

## (undated) RX ORDER — LIDOCAINE HYDROCHLORIDE 10 MG/ML
INJECTION, SOLUTION EPIDURAL; INFILTRATION; INTRACAUDAL; PERINEURAL
Status: DISPENSED
Start: 2021-10-01

## (undated) RX ORDER — DEXAMETHASONE SODIUM PHOSPHATE 4 MG/ML
INJECTION, SOLUTION INTRA-ARTICULAR; INTRALESIONAL; INTRAMUSCULAR; INTRAVENOUS; SOFT TISSUE
Status: DISPENSED
Start: 2021-10-01